# Patient Record
Sex: FEMALE | Race: WHITE | HISPANIC OR LATINO | Employment: OTHER | URBAN - NONMETROPOLITAN AREA
[De-identification: names, ages, dates, MRNs, and addresses within clinical notes are randomized per-mention and may not be internally consistent; named-entity substitution may affect disease eponyms.]

---

## 2017-01-04 ENCOUNTER — OFFICE VISIT (OUTPATIENT)
Dept: SURGERY | Facility: CLINIC | Age: 37
End: 2017-01-04

## 2017-01-04 VITALS
DIASTOLIC BLOOD PRESSURE: 70 MMHG | SYSTOLIC BLOOD PRESSURE: 120 MMHG | OXYGEN SATURATION: 98 % | BODY MASS INDEX: 40.8 KG/M2 | TEMPERATURE: 96.9 F | HEIGHT: 70 IN | HEART RATE: 90 BPM | WEIGHT: 285 LBS

## 2017-01-04 DIAGNOSIS — L02.415 CUTANEOUS ABSCESS OF RIGHT LOWER EXTREMITY: Primary | ICD-10-CM

## 2017-01-04 PROCEDURE — 99213 OFFICE O/P EST LOW 20 MIN: CPT | Performed by: SURGERY

## 2017-01-04 RX ORDER — SULFAMETHOXAZOLE AND TRIMETHOPRIM 400; 80 MG/1; MG/1
1 TABLET ORAL 2 TIMES DAILY
Qty: 14 TABLET | Refills: 0 | Status: SHIPPED | OUTPATIENT
Start: 2017-01-04 | End: 2017-01-11

## 2017-01-04 NOTE — MR AVS SNAPSHOT
Maria Murray   1/4/2017 10:30 AM   Office Visit    Dept Phone:  [phone]   Encounter #:  [id number]    Provider:  Sonia Conley MD   Department:  [facility] GENERAL SURGERY                Your Full Care Plan              Your Updated Medication List          This list is accurate as of: 1/4/17 10:41 AM.  Always use your most recent med list.                lamoTRIgine 100 MG tablet   Commonly known as:  LaMICtal       SYNTHROID 50 MCG tablet   Generic drug:  levothyroxine   Take 1 tablet by mouth Daily.       venlafaxine 37.5 MG tablet   Commonly known as:  EFFEXOR               You Were Diagnosed With        Codes Comments    Cutaneous abscess of right lower extremity    -  Primary ICD-10-CM: L02.415  ICD-9-CM: 682.6       Instructions     None    Patient Instructions History      Upcoming Appointments     Visit Type Date Time Department    FOLLOW UP 1/4/2017 10:30 AM [facility]    FOLLOW UP 1/5/2017  1:00 PM [facility]    ULTRASOUND 1/24/2017  2:10 PM [facility]YN [facility]    ULTRASOUND 1/24/2017  2:10 PM [facility]YN [facility]    FOLLOW UP 2/21/2017  1:00 PM [facility] PC [facility]    FOLLOW UP 6/6/2017  1:00 PM [facility]      MyChart Signup     Our records indicate that you have an active [facility] [employer]Street account.    You can view your After Visit Summary by going to [employer] and logging in with your QuinStreet username and password.  If you don't have a QuinStreet username and password but a parent or guardian has access to your record, the parent or guardian should login with their own QuinStreet username and password and access your record to view the After Visit Summary.    If you have questions, you can email [email or web address] or call [phone] to talk to our QuinStreet staff.  Remember, QuinStreet is NOT to be used for urgent needs.  For medical emergencies, dial 911.               Other Info from Your Visit       "       Your Appointments     Jan 05, 2017  1:00 PM EST   Follow Up with Sonia Conley MD   [facility] GENERAL SURGERY (--)    [address]  [facility] [address] [phone]           Arrive 15 minutes prior to appointment.            Jan 24, 2017  2:10 PM EST   Ultrasound with Donna Fuentes MD   [facility] OBSTETRICS AND GYNECOLOGY (--)    [address]  [facility] [address] [phone]            Jan 24, 2017  2:10 PM EST   Ultrasound with ULTRASOUND ROOM OBGYN [facility] OBSTETRICS AND GYNECOLOGY (--)    [address]  [facility] [address] [phone]            Feb 21, 2017  1:00 PM EST   Follow Up with Marilyn K Vermeesch, MD   [facility] PRIMARY CARE (--)    [address]  [facility] [address] [phone]           Arrive 15 minutes prior to appointment.            Jun 06, 2017  1:00 PM EDT   Follow Up with Virginia ROSALES MD   [facility] INTERNAL MEDICINE AND ENDOCRINOLOGY (--)    [address]  [facility] [address] [phone]           Arrive 15 minutes prior to appointment.              Allergies     Penicillins        Reason for Visit     Follow-up Rt. lower leg extremity      Vital Signs     Blood Pressure Pulse Temperature Height Weight Oxygen Saturation    120/70 90 96.9 °F (36.1 °C) 70\" (177.8 cm) 285 lb (129 kg) 98%    Body Mass Index Smoking Status                40.89 kg/m2 Never Smoker          Problems and Diagnoses Noted     Abscess        "

## 2017-01-04 NOTE — PROGRESS NOTES
Subjective   Maria Murray is a 36 y.o. female.   Chief Complaint   Patient presents with   • Follow-up     Rt. lower leg extremity       History of Present Illness     Right leg wound is healing slowly. No fever but has some drainage.    The following portions of the patient's history were reviewed and updated as appropriate: allergies, current medications, past family history, past medical history, past social history, past surgical history and problem list.    Review of Systems   Constitutional: Negative.    HENT: Negative.    Eyes: Negative.    Respiratory: Negative.    Cardiovascular: Negative.    Gastrointestinal: Negative.    Endocrine: Negative.    Genitourinary: Negative.    Musculoskeletal: Negative.    Skin: Negative.    Allergic/Immunologic: Negative.    Neurological: Negative.    Hematological: Negative.    Psychiatric/Behavioral: Negative.        Objective   Physical Exam   Constitutional: She appears well-developed and well-nourished. No distress.   HENT:   Head: Normocephalic and atraumatic.   Cardiovascular: Normal rate and regular rhythm.    Pulmonary/Chest: Effort normal.   Neurological: She is alert.   Skin: Skin is warm and dry. She is not diaphoretic.   Right leg wound- mild erythema and induration, no abscess, wound was cleaned and drebrided again   Psychiatric: She has a normal mood and affect. Her behavior is normal.   Nursing note and vitals reviewed.      Assessment/Plan   Right lower extremity dog bite wound is healing slow which could be due to DM. I will reevaluate tomorrow. If wound continues to deteriorate with fever, a wide excision with wound vac placement in OR will be required. I have explained this to pt who agrees.

## 2017-01-09 ENCOUNTER — OFFICE VISIT (OUTPATIENT)
Dept: SURGERY | Facility: CLINIC | Age: 37
End: 2017-01-09

## 2017-01-09 VITALS
HEART RATE: 74 BPM | HEIGHT: 70 IN | WEIGHT: 285 LBS | TEMPERATURE: 97.5 F | SYSTOLIC BLOOD PRESSURE: 120 MMHG | OXYGEN SATURATION: 98 % | BODY MASS INDEX: 40.8 KG/M2 | DIASTOLIC BLOOD PRESSURE: 60 MMHG

## 2017-01-09 DIAGNOSIS — L02.415 CUTANEOUS ABSCESS OF RIGHT LOWER EXTREMITY: Primary | ICD-10-CM

## 2017-01-09 PROCEDURE — 99212 OFFICE O/P EST SF 10 MIN: CPT | Performed by: SURGERY

## 2017-01-09 NOTE — PROGRESS NOTES
Subjective   Maria Murray is a 36 y.o. female.   Chief Complaint   Patient presents with   • Wound Check     Rt. lower extremity. Pt. is currently taking bactrim       History of Present Illness     Right leg wound looks much better today.  She denies fever.  She is doing daily dressing change    The following portions of the patient's history were reviewed and updated as appropriate: allergies, current medications, past family history, past medical history, past social history, past surgical history and problem list.    Review of Systems   Constitutional: Negative.    HENT: Negative.    Eyes: Negative.    Respiratory: Negative.    Cardiovascular: Negative.    Gastrointestinal: Negative.    Endocrine: Negative.    Genitourinary: Negative.    Musculoskeletal: Negative.    Skin: Positive for wound.   Allergic/Immunologic: Negative.    Neurological: Negative.    Hematological: Negative.    Psychiatric/Behavioral: Negative.        Objective   Physical Exam   Constitutional: She is oriented to person, place, and time. She appears well-developed and well-nourished. No distress.   HENT:   Head: Normocephalic and atraumatic.   Cardiovascular: Normal rate and regular rhythm.    Neurological: She is alert and oriented to person, place, and time.   Skin: Skin is warm and dry. No rash noted. She is not diaphoretic. No erythema.   No erythema today, no fluctuation or abscess, wound was cleaned and dressed   Nursing note and vitals reviewed.      Assessment/Plan   Right leg wound is healing well.  She will follow-up in 3 days for reevaluation.  She will do daily dressing change.

## 2017-01-09 NOTE — MR AVS SNAPSHOT
Maria Murray   1/9/2017 9:15 AM   Office Visit    Dept Phone:  [phone]   Encounter #:  [id number]    Provider:  Sonia Conley MD   Department:  [facility] GENERAL SURGERY                Your Full Care Plan              Your Updated Medication List          This list is accurate as of: 1/9/17  9:01 AM.  Always use your most recent med list.                lamoTRIgine 100 MG tablet   Commonly known as:  LaMICtal       sulfamethoxazole-trimethoprim 400-80 MG tablet   Commonly known as:  BACTRIM   Take 1 tablet by mouth 2 (Two) Times a Day for 7 days.       SYNTHROID 50 MCG tablet   Generic drug:  levothyroxine   Take 1 tablet by mouth Daily.       venlafaxine 37.5 MG tablet   Commonly known as:  EFFEXOR               You Were Diagnosed With        Codes Comments    Cutaneous abscess of right lower extremity    -  Primary ICD-10-CM: L02.415  ICD-9-CM: 682.6       Instructions     None    Patient Instructions History      Upcoming Appointments     Visit Type Date Time Department    FOLLOW UP 1/9/2017  9:15 AM [facility]    FOLLOW UP 1/12/2017  9:00 AM [facility]    ULTRASOUND 1/24/2017  2:10 PM [facility]YN [facility]    ULTRASOUND 1/24/2017  2:10 PM [facility]YN [facility]    FOLLOW UP 2/21/2017  1:00 PM [facility] PC [facility]    FOLLOW UP 6/6/2017  1:00 PM [facility]      MyChart Signup     Our records indicate that you have an active [employer] account.    You can view your After Visit Summary by going to [employer] and logging in with your [employer]k username and password.  If you don't have a Roadmunk username and password but a parent or guardian has access to your record, the parent or guardian should login with their own Roadmunk username and password and access your record to view the After Visit Summary.    If you have questions, you can email [email or web address] or call [phone] to talk to our [employer]  "staff.  Remember, MyChart is NOT to be used for urgent needs.  For medical emergencies, dial 911.               Other Info from Your Visit           Your Appointments     Jan 09, 2017  9:15 AM EST   Follow Up with Sonia Conley MD   [facility] GENERAL SURGERY (--)    [address]  [facility] [id number] [phone]           Arrive 15 minutes prior to appointment.            Jan 12, 2017  9:00 AM EST   Follow Up with Sonia Conley MD   [facility] GENERAL SURGERY (--)    [address]  [facility] [address] [phone]           Arrive 15 minutes prior to appointment.            Jan 24, 2017  2:10 PM EST   Ultrasound with Donna Fuentes MD   [facility] OBSTETRICS AND GYNECOLOGY (--)    [address]  [facility] [address]   [phone]            Jan 24, 2017  2:10 PM EST   Ultrasound with ULTRASOUND ROOM OBGYN [facility] OBSTETRICS AND GYNECOLOGY (--)    [address]  [facility] [address]   [phone]            Feb 21, 2017  1:00 PM EST   Follow Up with Marilyn K Vermeesch, MD   [facility] PRIMARY CARE (--)    [address]  [facility] [address]   [phone]           Arrive 15 minutes prior to appointment.              Allergies     Penicillins        Reason for Visit     Wound Check Rt. lower extremity. Pt. is currently taking bactrim      Vital Signs     Blood Pressure Pulse Temperature Height Weight Oxygen Saturation    120/60 74 97.5 °F (36.4 °C) 70\" (177.8 cm) 285 lb (129 kg) 98%    Body Mass Index Smoking Status                40.89 kg/m2 Never Smoker          Problems and Diagnoses Noted     Abscess        "

## 2017-01-12 ENCOUNTER — OFFICE VISIT (OUTPATIENT)
Dept: SURGERY | Facility: CLINIC | Age: 37
End: 2017-01-12

## 2017-01-12 VITALS
TEMPERATURE: 97 F | WEIGHT: 277 LBS | HEIGHT: 70 IN | BODY MASS INDEX: 39.65 KG/M2 | SYSTOLIC BLOOD PRESSURE: 120 MMHG | DIASTOLIC BLOOD PRESSURE: 60 MMHG | HEART RATE: 84 BPM | OXYGEN SATURATION: 98 %

## 2017-01-12 DIAGNOSIS — M25.561 CHRONIC PAIN OF RIGHT KNEE: Primary | ICD-10-CM

## 2017-01-12 DIAGNOSIS — L02.415 CUTANEOUS ABSCESS OF RIGHT LOWER EXTREMITY: Primary | ICD-10-CM

## 2017-01-12 DIAGNOSIS — G89.29 CHRONIC PAIN OF RIGHT KNEE: Primary | ICD-10-CM

## 2017-01-12 PROCEDURE — 99212 OFFICE O/P EST SF 10 MIN: CPT | Performed by: SURGERY

## 2017-01-12 NOTE — PROGRESS NOTES
Subjective   Maria Murray is a 36 y.o. female.   Chief Complaint   Patient presents with   • Wound Check     Rt. lower extremity   Patient states wound is healing. Patient denies N,V, and fever.     History of Present Illness     Right lower extremity wound is healing well.  Patient is doing daily dressing change.  She has finished her antibiotics.  She denies fever or pain.    The following portions of the patient's history were reviewed and updated as appropriate: allergies, current medications, past family history, past medical history, past social history, past surgical history and problem list.    Review of Systems   Constitutional: Negative.    HENT: Negative.    Eyes: Negative.    Respiratory: Negative.    Cardiovascular: Negative.    Gastrointestinal: Negative.    Endocrine: Negative.    Genitourinary: Negative.    Musculoskeletal: Negative.    Skin: Positive for wound.   Allergic/Immunologic: Negative.    Neurological: Negative.    Hematological: Negative.    Psychiatric/Behavioral: Negative.        Objective   Physical Exam   Constitutional: She is oriented to person, place, and time. She appears well-developed and well-nourished. No distress.   HENT:   Head: Normocephalic and atraumatic.   Cardiovascular: Normal rate and regular rhythm.    Neurological: She is alert and oriented to person, place, and time.   Skin: Skin is warm and dry. No rash noted. She is not diaphoretic. No erythema.   No erythema today, no fluctuation or abscess, wound was cleaned and dressed   Nursing note and vitals reviewed.      Assessment/Plan   Right lower extremity wound is healing well now.  I'll follow her when necessary.  She was instructed to call me or come to ER if she notices increasing redness, discharge or fever.

## 2017-01-12 NOTE — MR AVS SNAPSHOT
Maria Murray   1/12/2017 9:00 AM   Office Visit    Dept Phone:  [phone]   Encounter #:  [id number]    Provider:  Sonia Conley MD   Department:  [facility] GENERAL SURGERY                Your Full Care Plan              Your Updated Medication List          This list is accurate as of: 1/12/17  9:17 AM.  Always use your most recent med list.                lamoTRIgine 100 MG tablet   Commonly known as:  LaMICtal       SYNTHROID 50 MCG tablet   Generic drug:  levothyroxine   Take 1 tablet by mouth Daily.       venlafaxine 37.5 MG tablet   Commonly known as:  EFFEXOR               You Were Diagnosed With        Codes Comments    Cutaneous abscess of right lower extremity    -  Primary ICD-10-CM: L02.415  ICD-9-CM: 682.6       Instructions     None    Patient Instructions History      Upcoming Appointments     Visit Type Date Time Department    FOLLOW UP 1/12/2017  9:00 AM [facility]    ULTRASOUND 1/24/2017  2:10 PM [facility] O[facility] [place]    ULTRASOUND 1/24/2017  2:10 PM [facility] OBGYN [facility]    FOLLOW UP 2/21/2017  1:00 PM [facility] PC [facility]    FOLLOW UP 6/6/2017  1:00 PM [facility]      MyChart Signup     Our records indicate that you have an active [facility] [employer] account.    You can view your After Visit Summary by going to Be[employer] and logging in with your [employer] username and password.  If you don't have a [employer] username and password but a parent or guardian has access to your record, the parent or guardian should login with their own [employer] username and password and access your record to view the After Visit Summary.    If you have questions, you can email [email or web address] or call [phone] to talk to our [employer] staff.  Remember, [employer] is NOT to be used for urgent needs.  For medical emergencies, dial 911.               Other Info from Your Visit           Your Appointments     Jan 24, 2017  2:10 PM  "EST   Ultrasound with Donna Fuentes MD   [facility] OBSTETRICS AND GYNECOLOGY (--)    [address]  [facility] [address]   [phone]            Jan 24, 2017  2:10 PM EST   Ultrasound with ULTRASOUND ROOM OBGYN [facility] OBSTETRICS AND GYNECOLOGY (--)    [address]  [facility] [address]   [phone]            Feb 21, 2017  1:00 PM EST   Follow Up with Marilyn K Vermeesch, MD   [facility] PRIMARY CARE (--)    [address]  [facility] [address]   [phone]           Arrive 15 minutes prior to appointment.            Jun 06, 2017  1:00 PM EDT   Follow Up with Virginia ROSALES MD   [facility] INTERNAL MEDICINE AND ENDOCRINOLOGY (--)    [address]  [facility] [address] [phone]           Arrive 15 minutes prior to appointment.              Allergies     Penicillins        Reason for Visit     Wound Check Rt. lower extremity      Vital Signs     Blood Pressure Pulse Temperature Height Weight Oxygen Saturation    120/60 84 97 °F (36.1 °C) 70\" (177.8 cm) 277 lb (126 kg) 98%    Body Mass Index Smoking Status                39.75 kg/m2 Never Smoker          Problems and Diagnoses Noted     Abscess        "

## 2017-01-26 ENCOUNTER — OFFICE VISIT (OUTPATIENT)
Dept: SURGERY | Facility: CLINIC | Age: 37
End: 2017-01-26

## 2017-01-26 VITALS
DIASTOLIC BLOOD PRESSURE: 80 MMHG | HEIGHT: 70 IN | TEMPERATURE: 97.2 F | OXYGEN SATURATION: 98 % | HEART RATE: 82 BPM | WEIGHT: 287 LBS | SYSTOLIC BLOOD PRESSURE: 122 MMHG | BODY MASS INDEX: 41.09 KG/M2

## 2017-01-26 DIAGNOSIS — L02.415 CUTANEOUS ABSCESS OF RIGHT LOWER EXTREMITY: Primary | ICD-10-CM

## 2017-01-26 PROCEDURE — 99212 OFFICE O/P EST SF 10 MIN: CPT | Performed by: SURGERY

## 2017-01-26 NOTE — MR AVS SNAPSHOT
Maria Murray   1/26/2017 9:00 AM   Office Visit    Dept Phone:  [phone]   Encounter #:  [id number]    Provider:  Sonia Conley MD   Department:  [facility] GENERAL SURGERY                Your Full Care Plan              Your Updated Medication List          This list is accurate as of: 1/26/17  9:02 AM.  Always use your most recent med list.                lamoTRIgine 100 MG tablet   Commonly known as:  LaMICtal       SYNTHROID 50 MCG tablet   Generic drug:  levothyroxine   Take 1 tablet by mouth Daily.       venlafaxine 37.5 MG tablet   Commonly known as:  EFFEXOR               You Were Diagnosed With        Codes Comments    Cutaneous abscess of right lower extremity    -  Primary ICD-10-CM: L02.415  ICD-9-CM: 682.6       Instructions     None    Patient Instructions History      Upcoming Appointments     Visit Type Date Time Department    FOLLOW UP 1/26/2017  9:00 AM [facility]    NEW PATIENT 1/30/2017  9:00 AM [facility]    FOLLOW UP 1/30/2017  8:30 AM [facility]    FOLLOW UP 2/21/2017  1:00 PM [facility] PC [facility]    ULTRASOUND 2/22/2017  2:00 PM [facility] OBGYN [facility]    OB FOLLOWUP 2/22/2017  2:20 PM [facility] OBGYN [facility]    FOLLOW UP 6/6/2017  1:00 PM [facility]      MyChart Signup     Our records indicate that you have an active [employer] account.    You can view your After Visit Summary by going to [employer] and logging in with your [employer] username and password.  If you don't have a [employer] username and password but a parent or guardian has access to your record, the parent or guardian should login with their own Anam Mobile username and password and access your record to view the After Visit Summary.    If you have questions, you can email [email or web address] or call [phone] to talk to our [employer] staff.  Remember, [employer] is NOT to be used for urgent needs.  For medical  "emergencies, dial 911.               Other Info from Your Visit           Your Appointments     Jan 30, 2017  8:30 AM EST   Follow Up with Sonia Conley MD   [facility] GENERAL SURGERY (--)    [address]  [place] [address] [phone]           Arrive 15 minutes prior to appointment.            Jan 30, 2017  9:00 AM EST   New Patient with Dejan Rodas MD   [facility] ORTHOPEDICS AND SPORTS MEDICINE (--)    [address]  [place] [id number] [phone]           Bring all previous medical records and films, along with current medications and insurance information.            Feb 21, 2017  1:00 PM EST   Follow Up with Marilyn K Vermeesch, MD   [facility] PRIMARY CARE (--)    [address]  [place] [address] [phone]           Arrive 15 minutes prior to appointment.            Feb 22, 2017  2:00 PM EST   Ultrasound with ULTRASOUND ROOM OBGYN [facility] OBSTETRICS AND GYNECOLOGY (--)    [address]  [place] [address] [phone]            Feb 22, 2017  2:20 PM EST   OB FOLLOWUP with Donna Fuentes MD   [facility] OBSTETRICS AND GYNECOLOGY (--)    [address]  [place] [address] [phone]              Allergies     Penicillins        Reason for Visit     Wound Check Patient c/o redness around dog bite. Patient denies fever and nausea.       Vital Signs     Blood Pressure Pulse Temperature Height Weight Oxygen Saturation    122/80 82 97.2 °F (36.2 °C) 70\" (177.8 cm) 287 lb (130 kg) 98%    Body Mass Index Smoking Status                41.18 kg/m2 Never Smoker          Problems and Diagnoses Noted     Abscess        "

## 2017-01-26 NOTE — PROGRESS NOTES
Subjective   Maria Murray is a 37 y.o. female.   Chief Complaint   Patient presents with   • Wound Check     Patient c/o redness around dog bite. Patient denies fever and nausea.        History of Present Illness     Right lower extremity wound has gotten smaller in size.  Patient is complaining of some redness around the wound.  She denies any discharge or fever.    The following portions of the patient's history were reviewed and updated as appropriate: allergies, current medications, past family history, past medical history, past social history, past surgical history and problem list.    Review of Systems   Constitutional: Negative.    HENT: Negative.    Eyes: Negative.    Respiratory: Negative.    Cardiovascular: Negative.    Gastrointestinal: Negative.    Endocrine: Negative.    Genitourinary: Negative.    Musculoskeletal: Negative.    Skin: Positive for wound.   Allergic/Immunologic: Negative.    Neurological: Negative.    Hematological: Negative.    Psychiatric/Behavioral: Negative.        Objective   Physical Exam   Constitutional: She is oriented to person, place, and time. She appears well-developed.   Cardiovascular: Normal rate and regular rhythm.    Pulmonary/Chest: Effort normal.   Neurological: She is alert and oriented to person, place, and time.   Skin: Skin is warm and dry. No rash noted. No erythema.   Right lower extremity wound-very superficial and a small in size now, take a scab is present on top, scab was removed, there were collected debrided and fluid underneath the scab which was cleaned no definitive erythema or abscess   Psychiatric: She has a normal mood and affect. Her behavior is normal.   Nursing note reviewed.      Assessment/Plan   Patient was instructed to clean the wound twice daily and to keep removing scab so that fluid does not collect underneath. I will see her again on Monday.

## 2017-01-30 ENCOUNTER — OFFICE VISIT (OUTPATIENT)
Dept: ORTHOPEDIC SURGERY | Facility: CLINIC | Age: 37
End: 2017-01-30

## 2017-01-30 ENCOUNTER — OFFICE VISIT (OUTPATIENT)
Dept: SURGERY | Facility: CLINIC | Age: 37
End: 2017-01-30

## 2017-01-30 VITALS — RESPIRATION RATE: 18 BRPM | WEIGHT: 286.5 LBS | BODY MASS INDEX: 41.01 KG/M2 | HEIGHT: 70 IN

## 2017-01-30 VITALS
SYSTOLIC BLOOD PRESSURE: 124 MMHG | WEIGHT: 285.6 LBS | RESPIRATION RATE: 16 BRPM | HEART RATE: 76 BPM | OXYGEN SATURATION: 96 % | HEIGHT: 70 IN | BODY MASS INDEX: 40.89 KG/M2 | DIASTOLIC BLOOD PRESSURE: 80 MMHG | TEMPERATURE: 98.2 F

## 2017-01-30 DIAGNOSIS — G89.29 HIP PAIN, CHRONIC, RIGHT: ICD-10-CM

## 2017-01-30 DIAGNOSIS — G89.29 CHRONIC RIGHT-SIDED LOW BACK PAIN WITH RIGHT-SIDED SCIATICA: ICD-10-CM

## 2017-01-30 DIAGNOSIS — M25.551 HIP PAIN, CHRONIC, RIGHT: ICD-10-CM

## 2017-01-30 DIAGNOSIS — L02.415 CUTANEOUS ABSCESS OF RIGHT LOWER EXTREMITY: Primary | ICD-10-CM

## 2017-01-30 DIAGNOSIS — M94.261 CHONDROMALACIA, KNEE, RIGHT: Primary | ICD-10-CM

## 2017-01-30 DIAGNOSIS — M54.41 CHRONIC RIGHT-SIDED LOW BACK PAIN WITH RIGHT-SIDED SCIATICA: ICD-10-CM

## 2017-01-30 PROCEDURE — 99212 OFFICE O/P EST SF 10 MIN: CPT | Performed by: SURGERY

## 2017-01-30 PROCEDURE — 99244 OFF/OP CNSLTJ NEW/EST MOD 40: CPT | Performed by: ORTHOPAEDIC SURGERY

## 2017-01-30 RX ORDER — PROPRANOLOL HYDROCHLORIDE 10 MG/1
TABLET ORAL
COMMUNITY
Start: 2017-01-11 | End: 2017-03-27 | Stop reason: ALTCHOICE

## 2017-01-30 RX ORDER — MELOXICAM 7.5 MG/1
7.5 TABLET ORAL DAILY
Qty: 60 TABLET | Refills: 1 | Status: SHIPPED | OUTPATIENT
Start: 2017-01-30 | End: 2017-02-21

## 2017-01-30 NOTE — MR AVS SNAPSHOT
Maria Murray   1/30/2017 8:30 AM   Office Visit    Dept Phone:  [phone]   Encounter #:  [id number]    Provider:  Sonia Conley MD   Department:  [facility] GENERAL SURGERY                Your Full Care Plan              Your Updated Medication List          This list is accurate as of: 1/30/17  9:16 AM.  Always use your most recent med list.                lamoTRIgine 100 MG tablet   Commonly known as:  LaMICtal       propranolol 10 MG tablet   Commonly known as:  INDERAL       SYNTHROID 50 MCG tablet   Generic drug:  levothyroxine   Take 1 tablet by mouth Daily.       venlafaxine 37.5 MG tablet   Commonly known as:  EFFEXOR               Instructions     None    Patient Instructions History      Upcoming Appointments     Visit Type Date Time Department    NEW PATIENT 1/30/2017  9:00 AM [facility]    FOLLOW UP 1/30/2017  8:30 AM [facility] GEN [facility] [place]    FOLLOW UP 2/21/2017  1:00 PM [facility] PC [facility]    ULTRASOUND 2/22/2017  2:00 PM [facility]    OB FOLLOWUP 2/22/2017  2:20 PM [facility] [place]    FOLLOW UP 6/6/2017  1:00 PM [facility] END [facility]      MyChart Signup     Our records indicate that you have an active [employer] [facility] [employer] account.    You can view your After Visit Summary by going to [employer] and logging in with your [employer] username and password.  If you don't have a [employer] username and password but a parent or guardian has access to your record, the parent or guardian should login with their own [employer] username and password and access your record to view the After Visit Summary.    If you have questions, you can email [email or web address] or call [phone] to talk to our [employer] staff.  Remember, [employer] is NOT to be used for urgent needs.  For medical emergencies, dial 911.               Other Info from Your Visit           Your Appointments     Feb 21, 2017  1:00 PM EST   Follow Up with Sarah PAT  "Vermeesch, MD   [facility] PRIMARY CARE (--)    [address]  [place] [address] [phone]           Arrive 15 minutes prior to appointment.            Feb 22, 2017  2:00 PM EST   Ultrasound with ULTRASOUND ROOM OBGYN [facility] OBSTETRICS AND GYNECOLOGY (--)    [address]  [place] [address]   [phone]            Feb 22, 2017  2:20 PM EST   OB FOLLOWUP with Donna Fuentes MD   [facility] OBSTETRICS AND GYNECOLOGY (--)    [address]  [place] [facility] [place] [address]   [phone]            Jun 06, 2017  1:00 PM EDT   Follow Up with Virginia ROSALES MD   [facility] INTERNAL MEDICINE AND ENDOCRINOLOGY (--)    [address]  [place] [address] [phone]           Arrive 15 minutes prior to appointment.              Allergies     Penicillins        Reason for Visit     Follow-up abscess    Wound Check right lower extremity      Vital Signs     Blood Pressure Pulse Temperature Respirations Height Weight    124/80 76 98.2 °F (36.8 °C) (Temporal Artery ) 16 70\" (177.8 cm) 285 lb 9.6 oz (130 kg)    Oxygen Saturation Body Mass Index Smoking Status             96% 40.98 kg/m2 Never Smoker           "

## 2017-01-30 NOTE — PROGRESS NOTES
Subjective   Maria Murray is a 37 y.o. female.   Chief Complaint   Patient presents with   • Follow-up     abscess   • Wound Check     right lower extremity     Patient had her right lower leg wound debrided last visit. It still has some redness but is getting better.     History of Present Illness     Right leg wound is healing well. No fever.    The following portions of the patient's history were reviewed and updated as appropriate: allergies, current medications, past family history, past medical history, past social history, past surgical history and problem list.    Review of Systems   Constitutional: Negative.    HENT: Negative.    Eyes: Negative.    Respiratory: Negative.    Cardiovascular: Negative.    Gastrointestinal: Negative.    Endocrine: Negative.    Genitourinary: Negative.    Musculoskeletal: Negative.    Skin: Positive for color change and wound.   Allergic/Immunologic: Negative.    Neurological: Negative.    Hematological: Negative.    Psychiatric/Behavioral: Negative.        Objective   Physical Exam   Constitutional: She is oriented to person, place, and time. She appears well-developed.   Cardiovascular: Normal rate and regular rhythm.    Pulmonary/Chest: Effort normal.   Neurological: She is alert and oriented to person, place, and time.   Skin: Skin is warm and dry. No rash noted. No erythema.   Right lower extremity wound-very superficial and a small in size now, scab is present on top, scab was removed,  no definitive erythema or abscess   Psychiatric: She has a normal mood and affect. Her behavior is normal.   Nursing note reviewed.      Assessment/Plan   Wound almost healed. Will follow in 2 weeks.

## 2017-02-17 ENCOUNTER — LAB (OUTPATIENT)
Dept: INTERNAL MEDICINE | Facility: CLINIC | Age: 37
End: 2017-02-17

## 2017-02-17 DIAGNOSIS — R52 BODY ACHES: Primary | ICD-10-CM

## 2017-02-17 LAB
EXPIRATION DATE: NORMAL
FLUAV AG NPH QL: NEGATIVE
FLUBV AG NPH QL: NEGATIVE
INTERNAL CONTROL: NORMAL
Lab: NORMAL

## 2017-02-17 PROCEDURE — 87804 INFLUENZA ASSAY W/OPTIC: CPT | Performed by: PHYSICIAN ASSISTANT

## 2017-02-17 RX ORDER — OSELTAMIVIR PHOSPHATE 75 MG/1
75 CAPSULE ORAL DAILY
Qty: 10 CAPSULE | Refills: 0 | Status: SHIPPED | OUTPATIENT
Start: 2017-02-17 | End: 2017-02-21

## 2017-02-21 ENCOUNTER — OFFICE VISIT (OUTPATIENT)
Dept: INTERNAL MEDICINE | Facility: CLINIC | Age: 37
End: 2017-02-21

## 2017-02-21 VITALS
DIASTOLIC BLOOD PRESSURE: 78 MMHG | WEIGHT: 286 LBS | HEART RATE: 81 BPM | SYSTOLIC BLOOD PRESSURE: 126 MMHG | HEIGHT: 70 IN | BODY MASS INDEX: 40.94 KG/M2 | TEMPERATURE: 98.1 F | OXYGEN SATURATION: 98 %

## 2017-02-21 DIAGNOSIS — F32.A DEPRESSION, UNSPECIFIED DEPRESSION TYPE: ICD-10-CM

## 2017-02-21 DIAGNOSIS — E03.8 OTHER SPECIFIED HYPOTHYROIDISM: Primary | ICD-10-CM

## 2017-02-21 DIAGNOSIS — Z91.09 ENVIRONMENTAL ALLERGIES: ICD-10-CM

## 2017-02-21 DIAGNOSIS — K21.9 GERD WITHOUT ESOPHAGITIS: ICD-10-CM

## 2017-02-21 DIAGNOSIS — L91.8 SKIN TAG: ICD-10-CM

## 2017-02-21 PROCEDURE — 11200 RMVL SKIN TAGS UP TO&INC 15: CPT | Performed by: INTERNAL MEDICINE

## 2017-02-21 PROCEDURE — 99214 OFFICE O/P EST MOD 30 MIN: CPT | Performed by: INTERNAL MEDICINE

## 2017-02-21 NOTE — PROGRESS NOTES
Chief Complaint   Patient presents with   • Follow-up     6 months for GERD and Hypothyroidism. Pt would like to discuss skin tag removal and referral to ENT.      Subjective   Maria Murray is a 37 y.o. female.     HPI Comments: Here for followup of depression, celiac, hypothyroid and right knee pain.  She is on lamictal 200 mg q day, and effexor 37.5 mg BID. She is seeing her psychiatrist in [place].  She does not see a counselor, but soon will start.   She has several special-needs children with autism spectrum disorder.  She is very stressed at home.  Denies depression but does have some anxiety.  She is trying to follow a good gluten free diet.  Has some diarrhea, but gets more dizziness if she eats gluten.   Her right knee is doing OK, but she is starting PT per Dr Rodas.  She is not taking mobic.  Has a skin tag on right shoulder that is getting caught in her hair she would like removed.   Her allergies are bothering her.  Has chronic PND.  Her kids go to [facility] in [place] and she would like to be tested for allergies by them.  She does not take any thing.  Has very itchy eyes.   Thyroid labs were all normal in November 2016.       The following portions of the patient's history were reviewed and updated as appropriate: allergies, current medications, past family history, past medical history, past social history, past surgical history and problem list.    Review of Systems   Gastrointestinal: Positive for diarrhea.   Endocrine: Negative for cold intolerance and heat intolerance.   Musculoskeletal: Positive for arthralgias.   Skin:        Skin tag on right shoulder   Allergic/Immunologic: Positive for environmental allergies.   Neurological: Positive for dizziness.   Psychiatric/Behavioral: Negative for dysphoric mood, sleep disturbance and suicidal ideas. The patient is nervous/anxious.    All other systems reviewed and are negative.      Objective   Visit Vitals   • /78   • Pulse  "81   • Temp 98.1 °F (36.7 °C)   • Ht 70\" (177.8 cm)   • Wt 286 lb (130 kg)   • SpO2 98%   • BMI 41.04 kg/m2     Body mass index is 41.04 kg/(m^2).  Physical Exam   Constitutional: She is oriented to person, place, and time. She appears well-developed and well-nourished.   Morbidly obese   HENT:   Head: Normocephalic and atraumatic.   Mouth/Throat: Oropharynx is clear and moist.   Eyes: Conjunctivae and EOM are normal. Pupils are equal, round, and reactive to light.   Neck: Normal range of motion. Neck supple. No thyromegaly present.   Cardiovascular: Normal rate, regular rhythm, normal heart sounds and intact distal pulses.    No murmur heard.  Pulmonary/Chest: Effort normal and breath sounds normal. No respiratory distress.   Abdominal: Soft. Bowel sounds are normal.   Musculoskeletal: She exhibits no edema.   Lymphadenopathy:     She has no cervical adenopathy.   Neurological: She is alert and oriented to person, place, and time. No cranial nerve deficit.   Skin:   8 mm skin tag with narrow stalk on right shoulder, area cleansed with alcohol prep, sterile scissors and forceps used and skin tag was removed, no blood loss and patient tolerated procedure well   Psychiatric: She has a normal mood and affect. Her behavior is normal. Judgment normal.   Nursing note and vitals reviewed.      Assessment/Plan   Maria Murray is here today and the following problems have been addressed:      Maria was seen today for follow-up.    Diagnoses and all orders for this visit:    Other specified hypothyroidism    Depression, unspecified depression type    GERD without esophagitis    Environmental allergies  -     Ambulatory Referral to ENT (Otolaryngology)    Skin tag    Skin tag removed today, no complication  No med changes  Referred to ENT per pt request for allergy tested  Labs next visit  Continue to see psychiatrist  I do recommend she see counselor with her children for family counseling    RTC  6 mo or prn  "

## 2017-02-22 ENCOUNTER — OFFICE VISIT (OUTPATIENT)
Dept: OBSTETRICS AND GYNECOLOGY | Facility: CLINIC | Age: 37
End: 2017-02-22

## 2017-02-22 VITALS
HEIGHT: 70 IN | BODY MASS INDEX: 40.66 KG/M2 | WEIGHT: 284 LBS | DIASTOLIC BLOOD PRESSURE: 70 MMHG | SYSTOLIC BLOOD PRESSURE: 120 MMHG

## 2017-02-22 DIAGNOSIS — N94.6 DYSMENORRHEA: ICD-10-CM

## 2017-02-22 DIAGNOSIS — N92.1 MENORRHAGIA WITH IRREGULAR CYCLE: Primary | ICD-10-CM

## 2017-02-22 PROCEDURE — 76830 TRANSVAGINAL US NON-OB: CPT | Performed by: OBSTETRICS & GYNECOLOGY

## 2017-02-22 PROCEDURE — 99214 OFFICE O/P EST MOD 30 MIN: CPT | Performed by: OBSTETRICS & GYNECOLOGY

## 2017-02-22 NOTE — PROGRESS NOTES
Subjective  Chief Complaint   Patient presents with   • Follow-up     transvaginal ultrasound for menorrhagia     Patient is 37 y.o.  here for menorrhagia.  Pt gives history of menorrhagia with menses q 30-35 days; lasting 6-7 days.  Pt reports using menstrual cup with 2 oz of blood; changes 8x/24 hour.  Pt gives history of pp hemorrhage with previous pregnancies.  Pt reports having scheduled csection with last delivery secondary to history of pp hemorrhage with prior pregnancy.  Pt had delayed pp hemorrhage with Csection as well; reports receiving 12 units of PRBCs.  Pt delivered in [place].  Pt then  with no menses until November and now menses as noted above.  Pt reports seeing hematologist and denies any bleeding or clotting disorders.  Spouse has had vasectomy; pt has completed childbearing.    History  Past Medical History   Diagnosis Date   • Ankle sprain    • Anxiety    • Arthritis    • Bursitis of hip    • Chronic pain disorder    • Dislocation, hip    • Fracture, patella    • GERD (gastroesophageal reflux disease)    • Heart attack    • Migraine    • Migraines    • Palpitations    • Sore throat    • Strep throat    • Subluxation of patella    • Tear of meniscus of knee    • Thyroid disease    • Urinary symptom or sign      Current Outpatient Prescriptions on File Prior to Visit   Medication Sig Dispense Refill   • lamoTRIgine (LaMICtal) 100 MG tablet 2 (two) times a day.     • propranolol (INDERAL) 10 MG tablet      • SYNTHROID 50 MCG tablet Take 1 tablet by mouth Daily. 30 tablet 5   • venlafaxine (EFFEXOR) 37.5 MG tablet 37.5 mg 2 (Two) Times a Day.       No current facility-administered medications on file prior to visit.      Allergies   Allergen Reactions   • Penicillins      Past Surgical History   Procedure Laterality Date   •  section     • Other surgical history       Dilation And Curettage Of Cervical Stump   • Knee surgery        Left ATS     Family History  "  Problem Relation Age of Onset   • Bone cancer Other    • Diabetes Other    • Other Other      malignant neoplasm of breast   • Cancer Mother      breast   • Bone cancer Father    • Cancer Maternal Aunt      breast   • Diabetes Maternal Uncle      Social History     Social History   • Marital status:      Spouse name: N/A   • Number of children: N/A   • Years of education: N/A     Social History Main Topics   • Smoking status: Never Smoker   • Smokeless tobacco: Never Used   • Alcohol use No   • Drug use: No   • Sexual activity: Yes     Partners: Male     Birth control/ protection: Other      Comment:  vasectomy     Other Topics Concern   • None     Social History Narrative     Review of Systems  All systems were reviewed and negative except for:  Musculoskeletal: positive for  joint pain    Objective  Vitals:    02/22/17 1433   BP: 120/70   Weight: 284 lb (129 kg)   Height: 70\" (177.8 cm)     Physical Exam:  General Appearance: alert, appears stated age and cooperative  Head: normocephalic, without obvious abnormality and atraumatic  Eyes: lids and lashes normal, conjunctivae and sclerae normal, no icterus, no pallor and corneas clear  Ears: ears appear intact with no abnormalities noted  Neck: suppple, trachea midline and no thyromegaly  Lungs: clear to auscultation, respirations regular, respirations even and respirations unlabored  Heart: regular rhythm and normal rate, normal S1, S2, no murmur, gallop, or rubs and no click  Abdomen: normal bowel sounds, no masses, no hepatomegaly, no splenomegaly, soft non-tender, no guarding and no rebound tenderness  Extremities: moves extremities well, no edema, no cyanosis and no redness  Skin: no bleeding, bruising or rash and no leasions noted  Psych: normal mood and affect, oriented to person, time and place, thought content organized and appropriate judgment    Lab Review   No data reviewed    Imaging   Pelvic ultrasound images independantly reviewed - " TVS today shows normal uterus with ET 10.8mm; no fibroids seen; normal adnexae.    Assessment/Plan    Problem List Items Addressed This Visit     None      Visit Diagnoses     Menorrhagia with irregular cycle    -  Primary  Long discussion with patient regarding various treatment options.  Discussed medical management vs surgical.  Discussed hysterectomy vs trial with novasure ablation.  Risks, complications, benefits of each discussed.  All questions answered.  TVS findings reviewed with pt as well.  Info given in pamphlet form as well.  Pt to discuss with spouse and will call to schedule D&C, dx. Hysteroscopy with novasure ablation.    Relevant Orders    US Non-ob Transvaginal          Follow up prn    This note was electronically signed.  Donna Fuentes M.D.

## 2017-02-22 NOTE — PATIENT INSTRUCTIONS
Menorrhagia  Menorrhagia is the medical term for when your menstrual periods are heavy or last longer than usual. With menorrhagia, every period you have may cause enough blood loss and cramping that you are unable to maintain your usual activities.  CAUSES   In some cases, the cause of heavy periods is unknown, but a number of conditions may cause menorrhagia. Common causes include:  · A problem with the hormone-producing thyroid gland (hypothyroid).  · Noncancerous growths in the uterus (polyps or fibroids).  · An imbalance of the estrogen and progesterone hormones.  · One of your ovaries not releasing an egg during one or more months.  · Side effects of having an intrauterine device (IUD).  · Side effects of some medicines, such as anti-inflammatory medicines or blood thinners.  · A bleeding disorder that stops your blood from clotting normally.  SIGNS AND SYMPTOMS   During a normal period, bleeding lasts between 4 and 8 days. Signs that your periods are too heavy include:  · You routinely have to change your pad or tampon every 1 or 2 hours because it is completely soaked.  · You pass blood clots larger than 1 inch (2.5 cm) in size.  · You have bleeding for more than 7 days.  · You need to use pads and tampons at the same time because of heavy bleeding.  · You need to wake up to change your pads or tampons during the night.  · You have symptoms of anemia, such as tiredness, fatigue, or shortness of breath.   DIAGNOSIS   Your health care provider will perform a physical exam and ask you questions about your symptoms and menstrual history. Other tests may be ordered based on what the health care provider finds during the exam. These tests can include:  · Blood tests. Blood tests are used to check if you are pregnant or have hormonal changes, a bleeding or thyroid disorder, low iron levels (anemia), or other problems.  · Endometrial biopsy. Your health care provider takes a sample of tissue from the inside of your  uterus to be examined under a microscope.  · Pelvic ultrasound. This test uses sound waves to make a picture of your uterus, ovaries, and vagina. The pictures can show if you have fibroids or other growths.  · Hysteroscopy. For this test, your health care provider will use a small telescope to look inside your uterus.  Based on the results of your initial tests, your health care provider may recommend further testing.  TREATMENT   Treatment may not be needed. If it is needed, your health care provider may recommend treatment with one or more medicines first. If these do not reduce bleeding enough, a surgical treatment might be an option. The best treatment for you will depend on:   · Whether you need to prevent pregnancy.    · Your desire to have children in the future.  · The cause and severity of your bleeding.  · Your opinion and personal preference.    Medicines for menorrhagia may include:  · Birth control methods that use hormones. These include the pill, skin patch, vaginal ring, shots that you get every 3 months, hormonal IUD, and implant. These treatments reduce bleeding during your menstrual period.  · Medicines that thicken blood and slow bleeding.  · Medicines that reduce swelling, such as ibuprofen.   · Medicines that contain a synthetic hormone called progestin.    · Medicines that make the ovaries stop working for a short time.    You may need surgical treatment for menorrhagia if the medicines are unsuccessful. Treatment options include:  · Dilation and curettage (D&C). In this procedure, your health care provider opens (dilates) your cervix and then scrapes or suctions tissue from the lining of your uterus to reduce menstrual bleeding.  · Operative hysteroscopy. This procedure uses a tiny tube with a light (hysteroscope) to view your uterine cavity and can help in the surgical removal of a polyp that may be causing heavy periods.  · Endometrial ablation. Through various techniques, your health care  provider permanently destroys the entire lining of your uterus (endometrium). After endometrial ablation, most women have little or no menstrual flow. Endometrial ablation reduces your ability to become pregnant.  · Endometrial resection. This surgical procedure uses an electrosurgical wire loop to remove the lining of the uterus. This procedure also reduces your ability to become pregnant.  · Hysterectomy. Surgical removal of the uterus and cervix is a permanent procedure that stops menstrual periods. Pregnancy is not possible after a hysterectomy. This procedure requires anesthesia and hospitalization.  HOME CARE INSTRUCTIONS   · Only take over-the-counter or prescription medicines as directed by your health care provider. Take prescribed medicines exactly as directed. Do not change or switch medicines without consulting your health care provider.  · Take any prescribed iron pills exactly as directed by your health care provider. Long-term heavy bleeding may result in low iron levels. Iron pills help replace the iron your body lost from heavy bleeding. Iron may cause constipation. If this becomes a problem, increase the bran, fruits, and roughage in your diet.  · Do not take aspirin or medicines that contain aspirin 1 week before or during your menstrual period. Aspirin may make the bleeding worse.  · If you need to change your sanitary pad or tampon more than once every 2 hours, stay in bed and rest as much as possible until the bleeding stops.  · Eat well-balanced meals. Eat foods high in iron. Examples are leafy green vegetables, meat, liver, eggs, and whole grain breads and cereals. Do not try to lose weight until the abnormal bleeding has stopped and your blood iron level is back to normal.  SEEK MEDICAL CARE IF:   · You soak through a pad or tampon every 1 or 2 hours, and this happens every time you have a period.  · You need to use pads and tampons at the same time because you are bleeding so much.  · You  need to change your pad or tampon during the night.  · You have a period that lasts for more than 8 days.  · You pass clots bigger than 1 inch wide.  · You have irregular periods that happen more or less often than once a month.  · You feel dizzy or faint.  · You feel very weak or tired.  · You feel short of breath or feel your heart is beating too fast when you exercise.  · You have nausea and vomiting or diarrhea while you are taking your medicine.  · You have any problems that may be related to the medicine you are taking.  SEEK IMMEDIATE MEDICAL CARE IF:   · You soak through 4 or more pads or tampons in 2 hours.  · You have any bleeding while you are pregnant.  MAKE SURE YOU:   · Understand these instructions.  · Will watch your condition.  · Will get help right away if you are not doing well or get worse.     This information is not intended to replace advice given to you by your health care provider. Make sure you discuss any questions you have with your health care provider.     Document Released: 12/18/2006 Document Revised: 12/23/2014 Document Reviewed: 06/08/2014  [employer] Patient Education ©2016 [employer]

## 2017-03-08 DIAGNOSIS — N92.1 MENORRHAGIA WITH IRREGULAR CYCLE: Primary | ICD-10-CM

## 2017-03-08 RX ORDER — SODIUM CHLORIDE 0.9 % (FLUSH) 0.9 %
1-10 SYRINGE (ML) INJECTION AS NEEDED
Status: CANCELLED | OUTPATIENT
Start: 2017-03-08

## 2017-03-13 ENCOUNTER — RESULTS ENCOUNTER (OUTPATIENT)
Dept: OBSTETRICS AND GYNECOLOGY | Facility: CLINIC | Age: 37
End: 2017-03-13

## 2017-03-13 DIAGNOSIS — N92.1 MENORRHAGIA WITH IRREGULAR CYCLE: ICD-10-CM

## 2017-03-15 ENCOUNTER — OFFICE VISIT (OUTPATIENT)
Dept: ORTHOPEDIC SURGERY | Facility: CLINIC | Age: 37
End: 2017-03-15

## 2017-03-15 VITALS — WEIGHT: 290.5 LBS | HEIGHT: 70 IN | BODY MASS INDEX: 41.59 KG/M2 | RESPIRATION RATE: 19 BRPM

## 2017-03-15 DIAGNOSIS — M25.551 RIGHT HIP PAIN: Primary | ICD-10-CM

## 2017-03-15 DIAGNOSIS — M94.261 CHONDROMALACIA, KNEE, RIGHT: Primary | ICD-10-CM

## 2017-03-15 PROCEDURE — 99214 OFFICE O/P EST MOD 30 MIN: CPT | Performed by: ORTHOPAEDIC SURGERY

## 2017-03-15 NOTE — PROGRESS NOTES
Subjective   Patient ID: Maria Murray is a 37 y.o. female  Follow-up and Pain of the Right Knee             History of Present Illness  4-6 months of increasing anterior right knee pain not improve with physical therapy only short-term relief with 3 or 4 injections of cortisone each of which has only lead to 2-3 weeks of relief.  Pain occurs with standing sitting even walking special going up hills pain is anterior at the right knee.  Denies locking giving way.  MRIs confirmed chondromalacia patella no other meniscal pathology identified x-rays have appeared normal in the past.    Significant allergy to penicillin which causes hives.  No other acute cardiac pulmonary or medical illness currently.    She is scheduled for uterine ablation procedure end of April.      Review of Systems   Constitutional: Negative for diaphoresis, fever and unexpected weight change.   HENT: Negative for dental problem and sore throat.    Eyes: Negative for visual disturbance.   Respiratory: Negative for shortness of breath.    Cardiovascular: Negative for chest pain.   Gastrointestinal: Negative for abdominal pain, constipation, diarrhea, nausea and vomiting.   Genitourinary: Negative for difficulty urinating and frequency.   Musculoskeletal: Positive for arthralgias.   Neurological: Negative for headaches.   Hematological: Does not bruise/bleed easily.   All other systems reviewed and are negative.      Past Medical History   Diagnosis Date   • Ankle sprain    • Anxiety    • Arthritis    • Bursitis of hip    • Chronic pain disorder    • Dislocation, hip    • Fracture, patella    • GERD (gastroesophageal reflux disease)    • Heart attack    • Migraine    • Migraines    • Palpitations    • Sore throat    • Strep throat    • Subluxation of patella    • Tear of meniscus of knee    • Thyroid disease    • Urinary symptom or sign         Past Surgical History   Procedure Laterality Date   •  section     • Other surgical  "history       Dilation And Curettage Of Cervical Stump   • Knee surgery  2000      Left ATS       Family History   Problem Relation Age of Onset   • Bone cancer Other    • Diabetes Other    • Other Other      malignant neoplasm of breast   • Cancer Mother      breast   • Bone cancer Father    • Cancer Maternal Aunt      breast   • Diabetes Maternal Uncle        Social History     Social History   • Marital status:      Spouse name: N/A   • Number of children: N/A   • Years of education: N/A     Occupational History   • Not on file.     Social History Main Topics   • Smoking status: Never Smoker   • Smokeless tobacco: Never Used   • Alcohol use No   • Drug use: No   • Sexual activity: Yes     Partners: Male     Birth control/ protection: Other      Comment:  vasectomy     Other Topics Concern   • Not on file     Social History Narrative       Allergies   Allergen Reactions   • Penicillins        Objective   Visit Vitals   • Resp 19   • Ht 70\" (177.8 cm)   • Wt 290 lb 8 oz (132 kg)   • LMP 02/05/2017   • BMI 41.68 kg/m2      Physical Exam   Constitutional: She appears well-developed and well-nourished.   Cardiovascular: Normal rate, regular rhythm, S1 normal, S2 normal and normal heart sounds.    Pulmonary/Chest: Effort normal and breath sounds normal.   Musculoskeletal:        Right knee: She exhibits swelling. Tenderness found.     Constitutional: He is oriented to person, place, and time. He appears well-developed and well-nourished.   HENT:   Head: Normocephalic and atraumatic.   Eyes: EOM are normal. Pupils are equal, round, and reactive to light.   Neck: Normal range of motion. Neck supple.   Cardiovascular: Normal rate.    Pulmonary/Chest: Effort normal and breath sounds normal.   Abdominal: Soft.   Neurological: He is alert and oriented to person, place, and time.   Skin: Skin is warm and dry.   Psychiatric: He has a normal mood and affect.   Nursing note and vitals reviewed.       Ortho Exam "     Right knee with positive patellofemoral apprehension sign, positive inferolateral patellar tenderness, negative medial and lateral joint line pain, negative Kathleen sign, negative Lockman sign, calf supple, no quadriceps atrophy.  Well-healed medial distal calf traumatic laceration no sign of active cellulitis ankle edema or compromise of neurovascular status  Assessment/Plan   Review of Radiographic Studies:    No new imaging done today.  X-rays right knee in the past of appeared normal      Procedures     There are no diagnoses linked to this encounter.   Risk, benefits, and merits of treatment alternatives reviewed with the patient and questions answered and The nature of the proposed surgery reviewed with the patient including risks, benefits, rehabilitation, recovery timeframe, and outcome expectations      Recommendations/Plan:   Surgery: Surgery proposed at this visit as noted.  Plan for right knee diagnostic arthroscopy probable chondral shaving/chondroplasty and/or other procedures as indicated    Patient agreeable to call or return sooner for any concerns.             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

## 2017-03-17 RX ORDER — SODIUM CHLORIDE 0.9 % (FLUSH) 0.9 %
1-10 SYRINGE (ML) INJECTION AS NEEDED
Status: CANCELLED | OUTPATIENT
Start: 2017-03-17

## 2017-03-27 ENCOUNTER — APPOINTMENT (OUTPATIENT)
Dept: PREADMISSION TESTING | Facility: HOSPITAL | Age: 37
End: 2017-03-27

## 2017-03-27 ENCOUNTER — HOSPITAL ENCOUNTER (OUTPATIENT)
Dept: GENERAL RADIOLOGY | Facility: HOSPITAL | Age: 37
Discharge: HOME | End: 2017-03-27
Admitting: ORTHOPAEDIC SURGERY

## 2017-03-27 VITALS
HEIGHT: 70 IN | BODY MASS INDEX: 40.8 KG/M2 | DIASTOLIC BLOOD PRESSURE: 81 MMHG | WEIGHT: 285 LBS | SYSTOLIC BLOOD PRESSURE: 147 MMHG

## 2017-03-27 LAB
ALBUMIN SERPL-MCNC: 4.4 G/DL (ref 3.5–5)
ALBUMIN/GLOB SERPL: 1.1 G/DL (ref 1–2)
ALP SERPL-CCNC: 50 U/L (ref 38–126)
ALT SERPL W P-5'-P-CCNC: 27 U/L (ref 13–69)
ANION GAP SERPL CALCULATED.3IONS-SCNC: 19.4 MMOL/L
APTT PPP: 28.9 SECONDS (ref 25–36)
AST SERPL-CCNC: 23 U/L (ref 15–46)
B-HCG UR QL: NEGATIVE
BASOPHILS # BLD AUTO: 0.01 10*3/MM3 (ref 0–0.2)
BASOPHILS NFR BLD AUTO: 0.1 % (ref 0–2.5)
BILIRUB SERPL-MCNC: 0.7 MG/DL (ref 0.2–1.3)
BILIRUB UR QL STRIP: NEGATIVE
BUN BLD-MCNC: 15 MG/DL (ref 7–20)
BUN/CREAT SERPL: 21.4 (ref 7.1–23.5)
CALCIUM SPEC-SCNC: 9.4 MG/DL (ref 8.4–10.2)
CHLORIDE SERPL-SCNC: 102 MMOL/L (ref 98–107)
CLARITY UR: CLEAR
CO2 SERPL-SCNC: 27 MMOL/L (ref 26–30)
COLOR UR: YELLOW
CREAT BLD-MCNC: 0.7 MG/DL (ref 0.6–1.3)
DEPRECATED RDW RBC AUTO: 39.4 FL (ref 37–54)
EOSINOPHIL # BLD AUTO: 0.12 10*3/MM3 (ref 0–0.7)
EOSINOPHIL NFR BLD AUTO: 1.7 % (ref 0–7)
ERYTHROCYTE [DISTWIDTH] IN BLOOD BY AUTOMATED COUNT: 12.8 % (ref 11.5–14.5)
GFR SERPL CREATININE-BSD FRML MDRD: 94 ML/MIN/1.73
GLOBULIN UR ELPH-MCNC: 3.9 GM/DL
GLUCOSE BLD-MCNC: 91 MG/DL (ref 74–98)
GLUCOSE UR STRIP-MCNC: NEGATIVE MG/DL
HCT VFR BLD AUTO: 41 % (ref 37–47)
HGB BLD-MCNC: 13.2 G/DL (ref 12–16)
HGB UR QL STRIP.AUTO: NEGATIVE
IMM GRANULOCYTES # BLD: 0.02 10*3/MM3 (ref 0–0.06)
IMM GRANULOCYTES NFR BLD: 0.3 % (ref 0–0.6)
INR PPP: 1.18 (ref 0.9–1.1)
KETONES UR QL STRIP: NEGATIVE
LEUKOCYTE ESTERASE UR QL STRIP.AUTO: NEGATIVE
LYMPHOCYTES # BLD AUTO: 1.52 10*3/MM3 (ref 0.6–3.4)
LYMPHOCYTES NFR BLD AUTO: 21.7 % (ref 10–50)
MCH RBC QN AUTO: 27.6 PG (ref 27–31)
MCHC RBC AUTO-ENTMCNC: 32.2 G/DL (ref 30–37)
MCV RBC AUTO: 85.6 FL (ref 81–99)
MONOCYTES # BLD AUTO: 0.59 10*3/MM3 (ref 0–0.9)
MONOCYTES NFR BLD AUTO: 8.4 % (ref 0–12)
NEUTROPHILS # BLD AUTO: 4.75 10*3/MM3 (ref 2–6.9)
NEUTROPHILS NFR BLD AUTO: 67.8 % (ref 37–80)
NITRITE UR QL STRIP: NEGATIVE
NRBC BLD MANUAL-RTO: 0 /100 WBC (ref 0–0)
PH UR STRIP.AUTO: 7 [PH] (ref 5–8)
PLATELET # BLD AUTO: 301 10*3/MM3 (ref 130–400)
PMV BLD AUTO: 12 FL (ref 6–12)
POTASSIUM BLD-SCNC: 4.4 MMOL/L (ref 3.5–5.1)
PROT SERPL-MCNC: 8.3 G/DL (ref 6.3–8.2)
PROT UR QL STRIP: NEGATIVE
PROTHROMBIN TIME: 12.9 SECONDS (ref 9.3–12.1)
RBC # BLD AUTO: 4.79 10*6/MM3 (ref 4.2–5.4)
SODIUM BLD-SCNC: 144 MMOL/L (ref 137–145)
SP GR UR STRIP: 1.02 (ref 1–1.03)
UROBILINOGEN UR QL STRIP: NORMAL
WBC NRBC COR # BLD: 7.01 10*3/MM3 (ref 4.8–10.8)

## 2017-03-27 PROCEDURE — 81025 URINE PREGNANCY TEST: CPT | Performed by: ORTHOPAEDIC SURGERY

## 2017-03-27 PROCEDURE — 81003 URINALYSIS AUTO W/O SCOPE: CPT | Performed by: ORTHOPAEDIC SURGERY

## 2017-03-27 PROCEDURE — 36415 COLL VENOUS BLD VENIPUNCTURE: CPT | Performed by: ORTHOPAEDIC SURGERY

## 2017-03-27 PROCEDURE — 93010 ELECTROCARDIOGRAM REPORT: CPT | Performed by: INTERNAL MEDICINE

## 2017-03-27 PROCEDURE — 80053 COMPREHEN METABOLIC PANEL: CPT | Performed by: ORTHOPAEDIC SURGERY

## 2017-03-27 PROCEDURE — 87081 CULTURE SCREEN ONLY: CPT | Performed by: ORTHOPAEDIC SURGERY

## 2017-03-27 PROCEDURE — 71020 HC CHEST PA AND LATERAL: CPT

## 2017-03-27 PROCEDURE — 85025 COMPLETE CBC W/AUTO DIFF WBC: CPT | Performed by: ORTHOPAEDIC SURGERY

## 2017-03-27 PROCEDURE — 85610 PROTHROMBIN TIME: CPT | Performed by: ORTHOPAEDIC SURGERY

## 2017-03-27 PROCEDURE — 85730 THROMBOPLASTIN TIME PARTIAL: CPT | Performed by: ORTHOPAEDIC SURGERY

## 2017-03-27 NOTE — PAT
"CALLED JOSE CARLOS ADEN CRNA R/T PT HX OF MI IN 1991.  PT STATED SHE HAS NOT SEEN ANYONE SINCE 2011.  STATES WAS SEEN IN [facility] BY A [employer] MD.  BE DEVINE TODAY.  REVIEWED PM WITH JOSE CARLOS AS WELL.  JOSE CARLOS STATES \"IT'S FINE.  WE DON'T NEED ANYTHING ELSE.\"   "

## 2017-03-29 LAB — MRSA SPEC QL CULT: NORMAL

## 2017-03-30 ENCOUNTER — OFFICE VISIT (OUTPATIENT)
Dept: INTERNAL MEDICINE | Facility: CLINIC | Age: 37
End: 2017-03-30

## 2017-03-30 VITALS
TEMPERATURE: 98.9 F | OXYGEN SATURATION: 98 % | SYSTOLIC BLOOD PRESSURE: 132 MMHG | HEART RATE: 73 BPM | WEIGHT: 285 LBS | HEIGHT: 70 IN | DIASTOLIC BLOOD PRESSURE: 70 MMHG | BODY MASS INDEX: 40.8 KG/M2

## 2017-03-30 DIAGNOSIS — G44.209 TENSION HEADACHE: Primary | ICD-10-CM

## 2017-03-30 PROBLEM — L91.8 SKIN TAG: Status: RESOLVED | Noted: 2017-02-21 | Resolved: 2017-03-30

## 2017-03-30 PROCEDURE — 99213 OFFICE O/P EST LOW 20 MIN: CPT | Performed by: INTERNAL MEDICINE

## 2017-03-30 RX ORDER — METHOCARBAMOL 500 MG/1
500 TABLET, FILM COATED ORAL 3 TIMES DAILY
Qty: 60 TABLET | Refills: 3 | Status: SHIPPED | OUTPATIENT
Start: 2017-03-30 | End: 2017-04-24

## 2017-03-30 NOTE — PROGRESS NOTES
"Chief Complaint   Patient presents with   • Headache     off and on since Saturday.     Subjective   Maria Murray is a 37 y.o. female.     HPI Comments: Here with complaint of HA on and off for past 4-5 days.   Pain is behind ears.  She was wearing glasses and pain was under the area where glasses were sitting  Today has some frontal pressure, but no HA.   She is also having pain in upper shoulders and back/neck.  She has pain when she turns head to left that radiates into left upper shoulder and neck area.     She is getting PT for her knee and he told her he could work on shoulders and neck with an order.        The following portions of the patient's history were reviewed and updated as appropriate: allergies, current medications, past family history, past medical history, past social history, past surgical history and problem list.    Review of Systems   Constitutional: Negative for fever.   HENT: Positive for congestion and sinus pressure. Negative for ear pain and sore throat.    Musculoskeletal: Positive for back pain, myalgias, neck pain and neck stiffness.   Allergic/Immunologic: Positive for environmental allergies.   Neurological: Positive for headaches.       Objective   /70  Pulse 73  Temp 98.9 °F (37.2 °C)  Ht 70\" (177.8 cm)  Wt 285 lb (129 kg)  LMP 03/06/2017  SpO2 98%  BMI 40.89 kg/m2  Body mass index is 40.89 kg/(m^2).  Physical Exam   Constitutional: She is oriented to person, place, and time. She appears well-developed and well-nourished.   HENT:   Head: Normocephalic.   Eyes: EOM are normal. Pupils are equal, round, and reactive to light.   Neck: Normal range of motion. Neck supple.   Musculoskeletal:   Pain over posterior occipital scalp at insertion of paraspinous cervical muscles, pain over the cervical muscles posteriorly with palpation, full range of motion of neck muscles but pain with forward flexion and right lateral neck flexion and right neck rotation into her left " sternocleidomastoid muscles   Neurological: She is alert and oriented to person, place, and time. She has normal reflexes. No cranial nerve deficit. Coordination normal.   Psychiatric: She has a normal mood and affect. Judgment normal.   Nursing note and vitals reviewed.      Assessment/Plan   Maria Murray is here today and the following problems have been addressed:      Maria was seen today for headache.    Diagnoses and all orders for this visit:    Tension headache    Other orders  -     methocarbamol (ROBAXIN) 500 MG tablet; Take 1 tablet by mouth 3 (Three) Times a Day.    Recommend heat to neck a few times daily  Given written order for PT on neck with her current therapist  Given robaxin 500 mg TID prn for neck spasm    RTC prn    Please note that portions of this note were completed with a voice recognition program.  Efforts were made to edit dictation, but occasionally words are mistranscribed.

## 2017-03-31 ENCOUNTER — ANESTHESIA (OUTPATIENT)
Dept: PERIOP | Facility: HOSPITAL | Age: 37
End: 2017-03-31

## 2017-03-31 ENCOUNTER — ANESTHESIA EVENT (OUTPATIENT)
Dept: PERIOP | Facility: HOSPITAL | Age: 37
End: 2017-03-31

## 2017-03-31 ENCOUNTER — HOSPITAL ENCOUNTER (OUTPATIENT)
Facility: HOSPITAL | Age: 37
Setting detail: HOSPITAL OUTPATIENT SURGERY
Discharge: HOME | End: 2017-03-31
Attending: ORTHOPAEDIC SURGERY | Admitting: ORTHOPAEDIC SURGERY

## 2017-03-31 VITALS
OXYGEN SATURATION: 98 % | DIASTOLIC BLOOD PRESSURE: 84 MMHG | SYSTOLIC BLOOD PRESSURE: 127 MMHG | RESPIRATION RATE: 18 BRPM | TEMPERATURE: 97.5 F | HEART RATE: 60 BPM

## 2017-03-31 DIAGNOSIS — N92.1 MENORRHAGIA WITH IRREGULAR CYCLE: ICD-10-CM

## 2017-03-31 DIAGNOSIS — M94.261 CHONDROMALACIA, KNEE, RIGHT: ICD-10-CM

## 2017-03-31 PROCEDURE — 25010000002 PROPOFOL 200 MG/20ML EMULSION: Performed by: NURSE ANESTHETIST, CERTIFIED REGISTERED

## 2017-03-31 PROCEDURE — S0260 H&P FOR SURGERY: HCPCS | Performed by: ORTHOPAEDIC SURGERY

## 2017-03-31 PROCEDURE — 29881 ARTHRS KNE SRG MNISECTMY M/L: CPT | Performed by: ORTHOPAEDIC SURGERY

## 2017-03-31 PROCEDURE — 25010000002 PROPOFOL 1000 MG/ML EMULSION

## 2017-03-31 PROCEDURE — 25010000002 EPINEPHRINE 1 MG/ML SOLUTION: Performed by: ORTHOPAEDIC SURGERY

## 2017-03-31 PROCEDURE — 25010000002 ONDANSETRON PER 1 MG: Performed by: ORTHOPAEDIC SURGERY

## 2017-03-31 RX ORDER — OXYCODONE HYDROCHLORIDE AND ACETAMINOPHEN 5; 325 MG/1; MG/1
1 TABLET ORAL EVERY 4 HOURS PRN
Status: CANCELLED | OUTPATIENT
Start: 2017-03-31 | End: 2017-04-10

## 2017-03-31 RX ORDER — CELECOXIB 100 MG/1
CAPSULE ORAL
Status: COMPLETED
Start: 2017-03-31 | End: 2017-03-31

## 2017-03-31 RX ORDER — ONDANSETRON 4 MG/1
4 TABLET, FILM COATED ORAL ONCE AS NEEDED
Status: DISCONTINUED | OUTPATIENT
Start: 2017-03-31 | End: 2017-03-31 | Stop reason: HOSPADM

## 2017-03-31 RX ORDER — LIDOCAINE HYDROCHLORIDE AND EPINEPHRINE BITARTRATE 20; .01 MG/ML; MG/ML
INJECTION, SOLUTION SUBCUTANEOUS
Status: DISCONTINUED
Start: 2017-03-31 | End: 2017-03-31 | Stop reason: HOSPADM

## 2017-03-31 RX ORDER — SODIUM CHLORIDE, SODIUM LACTATE, POTASSIUM CHLORIDE, CALCIUM CHLORIDE 600; 310; 30; 20 MG/100ML; MG/100ML; MG/100ML; MG/100ML
1000 INJECTION, SOLUTION INTRAVENOUS CONTINUOUS PRN
Status: DISCONTINUED | OUTPATIENT
Start: 2017-03-31 | End: 2017-03-31 | Stop reason: HOSPADM

## 2017-03-31 RX ORDER — BUPIVACAINE HYDROCHLORIDE AND EPINEPHRINE 5; 5 MG/ML; UG/ML
INJECTION, SOLUTION EPIDURAL; INTRACAUDAL; PERINEURAL
Status: DISCONTINUED
Start: 2017-03-31 | End: 2017-03-31 | Stop reason: HOSPADM

## 2017-03-31 RX ORDER — HYDROCODONE BITARTRATE AND ACETAMINOPHEN 5; 325 MG/1; MG/1
1-2 TABLET ORAL EVERY 4 HOURS PRN
Qty: 40 TABLET | Refills: 0 | Status: SHIPPED | OUTPATIENT
Start: 2017-03-31 | End: 2017-04-24

## 2017-03-31 RX ORDER — PROPOFOL 10 MG/ML
INJECTION, EMULSION INTRAVENOUS CONTINUOUS PRN
Status: DISCONTINUED | OUTPATIENT
Start: 2017-03-31 | End: 2017-03-31 | Stop reason: SURG

## 2017-03-31 RX ORDER — BUPIVACAINE HYDROCHLORIDE AND EPINEPHRINE 5; 5 MG/ML; UG/ML
INJECTION, SOLUTION EPIDURAL; INTRACAUDAL; PERINEURAL AS NEEDED
Status: DISCONTINUED | OUTPATIENT
Start: 2017-03-31 | End: 2017-03-31 | Stop reason: HOSPADM

## 2017-03-31 RX ORDER — PROPOFOL 10 MG/ML
INJECTION, EMULSION INTRAVENOUS AS NEEDED
Status: DISCONTINUED | OUTPATIENT
Start: 2017-03-31 | End: 2017-03-31 | Stop reason: SURG

## 2017-03-31 RX ORDER — LIDOCAINE HYDROCHLORIDE 10 MG/ML
INJECTION, SOLUTION INFILTRATION; PERINEURAL AS NEEDED
Status: DISCONTINUED | OUTPATIENT
Start: 2017-03-31 | End: 2017-03-31 | Stop reason: HOSPADM

## 2017-03-31 RX ORDER — CELECOXIB 100 MG/1
200 CAPSULE ORAL ONCE
Status: COMPLETED | OUTPATIENT
Start: 2017-03-31 | End: 2017-03-31

## 2017-03-31 RX ORDER — ACETAMINOPHEN 500 MG
1000 TABLET ORAL ONCE
Status: COMPLETED | OUTPATIENT
Start: 2017-03-31 | End: 2017-03-31

## 2017-03-31 RX ORDER — ACETAMINOPHEN 500 MG
TABLET ORAL
Status: COMPLETED
Start: 2017-03-31 | End: 2017-03-31

## 2017-03-31 RX ORDER — ONDANSETRON 2 MG/ML
4 INJECTION INTRAMUSCULAR; INTRAVENOUS ONCE AS NEEDED
Status: COMPLETED | OUTPATIENT
Start: 2017-03-31 | End: 2017-03-31

## 2017-03-31 RX ORDER — ONDANSETRON 2 MG/ML
INJECTION INTRAMUSCULAR; INTRAVENOUS
Status: DISCONTINUED
Start: 2017-03-31 | End: 2017-03-31 | Stop reason: HOSPADM

## 2017-03-31 RX ORDER — EPINEPHRINE 1 MG/ML
INJECTION INTRAMUSCULAR; INTRAVENOUS; SUBCUTANEOUS AS NEEDED
Status: DISCONTINUED | OUTPATIENT
Start: 2017-03-31 | End: 2017-03-31 | Stop reason: HOSPADM

## 2017-03-31 RX ORDER — SODIUM CHLORIDE 0.9 % (FLUSH) 0.9 %
1-10 SYRINGE (ML) INJECTION AS NEEDED
Status: DISCONTINUED | OUTPATIENT
Start: 2017-03-31 | End: 2017-03-31 | Stop reason: HOSPADM

## 2017-03-31 RX ADMIN — PROPOFOL 100 MCG/KG/MIN: 10 INJECTION, EMULSION INTRAVENOUS at 09:47

## 2017-03-31 RX ADMIN — ACETAMINOPHEN 1000 MG: 500 TABLET, COATED ORAL at 09:27

## 2017-03-31 RX ADMIN — SODIUM CHLORIDE 900 MG: 9 INJECTION, SOLUTION INTRAVENOUS at 09:38

## 2017-03-31 RX ADMIN — CELECOXIB 200 MG: 100 CAPSULE ORAL at 09:26

## 2017-03-31 RX ADMIN — PROPOFOL 150 MG: 10 INJECTION, EMULSION INTRAVENOUS at 09:41

## 2017-03-31 RX ADMIN — SODIUM CHLORIDE, POTASSIUM CHLORIDE, SODIUM LACTATE AND CALCIUM CHLORIDE: 600; 310; 30; 20 INJECTION, SOLUTION INTRAVENOUS at 09:38

## 2017-03-31 RX ADMIN — Medication 50 MG: at 09:39

## 2017-03-31 RX ADMIN — SODIUM CHLORIDE, POTASSIUM CHLORIDE, SODIUM LACTATE AND CALCIUM CHLORIDE 1000 ML: 600; 310; 30; 20 INJECTION, SOLUTION INTRAVENOUS at 09:17

## 2017-03-31 RX ADMIN — Medication 1000 MG: at 09:27

## 2017-03-31 RX ADMIN — ONDANSETRON 4 MG: 2 INJECTION INTRAMUSCULAR; INTRAVENOUS at 10:49

## 2017-03-31 NOTE — ANESTHESIA PREPROCEDURE EVALUATION
Anesthesia Evaluation     Patient summary reviewed and Nursing notes reviewed   NPO Status: > 8 hours   Airway   Mallampati: II  TM distance: >3 FB  Neck ROM: full  no difficulty expected  Dental    (+) poor dentition    Pulmonary - normal exam   (+) recent URI resolved,     ROS comment: FINDINGS: The heart is normal in size. The mediastinum is unremarkable.  The lungs are clear. There is no pneumothorax. There are no acute  osseous abnormalities.       IMPRESSION:  No acute cardiopulmonary process.  Cardiovascular   Exercise tolerance: good (4-7 METS)    ECG reviewed  Rhythm: regular  Rate: normal    (+) past MI  >12 months,     ROS comment: Pt states MI with child birth dt significant blood loss.       Neuro/Psych  (+) headaches, psychiatric history Anxiety and Depression,    GI/Hepatic/Renal/Endo    (+) obesity,  GERD well controlled, hypothyroidism,     Musculoskeletal     Abdominal    Substance History      OB/GYN          Other   (+) arthritis                               Anesthesia Plan    ASA 2     general, MAC and regional   (Postop acb if necessary)  intravenous induction   Anesthetic plan and risks discussed with patient.

## 2017-03-31 NOTE — ANESTHESIA POSTPROCEDURE EVALUATION
Patient: Maria Murray    Procedure Summary     Date Anesthesia Start Anesthesia Stop Room / Location    03/31/17 0938 1019  [facility] / [facility]       Procedure Diagnosis Surgeon Provider    RIGHT KNEE DIAGNOSTIC ARTHROSCOPY WITH PARTIAL LATERAL MENISECTOMY, CHONDRAL SHAVING PATELLA (Right Knee) Chondromalacia, knee, right  (Chondromalacia, knee, right [M94.261]) MD Adrián Jerez, DESIRE          Anesthesia Type: general  Last vitals  /68 (03/31/17 0923)    Temp 98.4 °F (36.9 °C) (03/31/17 0923)    Pulse 65 (03/31/17 0923)   Resp 16 (03/31/17 0923)    SpO2 99 % (03/31/17 0923)      Post Anesthesia Care and Evaluation    Patient location during evaluation: bedside  Patient participation: complete - patient participated  Level of consciousness: awake and alert  Pain score: 1  Pain management: satisfactory to patient  Airway patency: patent  Anesthetic complications: No anesthetic complications  PONV Status: none  Cardiovascular status: acceptable and hemodynamically stable  Respiratory status: acceptable  Hydration status: acceptable

## 2017-04-12 ENCOUNTER — OFFICE VISIT (OUTPATIENT)
Dept: ORTHOPEDIC SURGERY | Facility: CLINIC | Age: 37
End: 2017-04-12

## 2017-04-12 VITALS — HEIGHT: 70 IN | WEIGHT: 292.2 LBS | BODY MASS INDEX: 41.83 KG/M2 | RESPIRATION RATE: 18 BRPM

## 2017-04-12 DIAGNOSIS — M94.261 CHONDROMALACIA, KNEE, RIGHT: Primary | ICD-10-CM

## 2017-04-12 PROCEDURE — 99024 POSTOP FOLLOW-UP VISIT: CPT | Performed by: ORTHOPAEDIC SURGERY

## 2017-04-12 NOTE — PROGRESS NOTES
"Subjective   Patient ID: Maria Murray is a 37 y.o. female  Post-op of the Right Knee (Surgery Date: 3/31/2017; Diagnostic arthroscopy right knee with partial posterior horn medial meniscectomy, chondroplasty chondral shaving medial and lateral facets inferior pole right patella.)             History of Present Illness    Doing very well status post right knee partial medial meniscectomy and shaving chondroplasty patella    Review of Systems   Constitutional: Negative for diaphoresis, fever and unexpected weight change.   HENT: Negative for dental problem and sore throat.    Eyes: Negative for visual disturbance.   Respiratory: Negative for shortness of breath.    Cardiovascular: Negative for chest pain.   Gastrointestinal: Negative for abdominal pain, constipation, diarrhea, nausea and vomiting.   Genitourinary: Negative for difficulty urinating and frequency.   Musculoskeletal: Positive for arthralgias.   Neurological: Negative for headaches.   Hematological: Does not bruise/bleed easily.   All other systems reviewed and are negative.      Past Medical History:   Diagnosis Date   • Ankle sprain     LEFT AND RIGHT   • Anxiety    • Arthritis    • Bursitis of hip     RIGHT   • Celiac disease    • Chronic pain disorder    • Dislocation, hip     RIGHT   • Fracture, patella     LEFT   • GERD (gastroesophageal reflux disease)    • H/O exercise stress test     STATES \"IT WAS OK\"   • Heart attack     STATES HAD MI AFTER CHILDBIRTH-STATES DUE TO HYPOVOLEMIA   • Migraine    • Palpitations    • PCOS (polycystic ovarian syndrome)    • Sore throat    • Strep throat    • Subluxation of patella    • Tear of meniscus of knee     LEFT   • Thyroid disease    • Urinary symptom or sign    • Wears glasses         Past Surgical History:   Procedure Laterality Date   •  SECTION     • KNEE ARTHROSCOPY Right 3/31/2017    Procedure: RIGHT KNEE DIAGNOSTIC ARTHROSCOPY WITH PARTIAL LATERAL MENISECTOMY, CHONDRAL SHAVING " "PATELLA;  Surgeon: Dejan Rodas MD;  Location: [facility];  Service:    • KNEE SURGERY  2000     Left ATS   • OTHER SURGICAL HISTORY      Dilation And Curettage Of Cervical Stump       Family History   Problem Relation Age of Onset   • Bone cancer Other    • Diabetes Other    • Other Other      malignant neoplasm of breast   • Cancer Mother      breast   • Bone cancer Father    • Cancer Maternal Aunt      breast   • Diabetes Maternal Uncle        Social History     Social History   • Marital status:      Spouse name: N/A   • Number of children: N/A   • Years of education: N/A     Occupational History   • Not on file.     Social History Main Topics   • Smoking status: Never Smoker   • Smokeless tobacco: Never Used   • Alcohol use No   • Drug use: No   • Sexual activity: Defer      Comment:  vasectomy     Other Topics Concern   • Not on file     Social History Narrative       Allergies   Allergen Reactions   • Penicillins Hives       Objective   Resp 18  Ht 70\" (177.8 cm)  Wt 292 lb 3.2 oz (133 kg)  LMP 03/06/2017  BMI 41.93 kg/m2   Physical Exam  Constitutional: He is oriented to person, place, and time. He appears well-developed and well-nourished.   HENT:   Head: Normocephalic and atraumatic.   Eyes: EOM are normal. Pupils are equal, round, and reactive to light.   Neck: Normal range of motion. Neck supple.   Cardiovascular: Normal rate.    Pulmonary/Chest: Effort normal and breath sounds normal.   Abdominal: Soft.   Neurological: He is alert and oriented to person, place, and time.   Skin: Skin is warm and dry.   Psychiatric: He has a normal mood and affect.   Nursing note and vitals reviewed.       Ortho Exam   Right knee incisions well-healed sutures removed Steri-Strips applied, calf supple, full knee extension minimal tenderness over the medial joint line    Assessment/Plan   Review of Radiographic Studies:    No new imaging done today.      Procedures        Physical therapy referral " given      Recommendations/Plan:   Work/Activity Status: May perform usual activities as tolerated      Patient agreeable to call or return sooner for any concerns.             Impression:  Right knee status post partial medial meniscectomy and chondral shaving patella  Plan:  Plan for progression in rehabilitation recheck in 6 weeks for one final visit

## 2017-04-21 ENCOUNTER — APPOINTMENT (OUTPATIENT)
Dept: PREADMISSION TESTING | Facility: HOSPITAL | Age: 37
End: 2017-04-21

## 2017-04-24 ENCOUNTER — APPOINTMENT (OUTPATIENT)
Dept: PREADMISSION TESTING | Facility: HOSPITAL | Age: 37
End: 2017-04-24

## 2017-04-24 VITALS — HEIGHT: 71 IN | WEIGHT: 285 LBS | BODY MASS INDEX: 39.9 KG/M2

## 2017-04-24 LAB
ABO GROUP BLD: NORMAL
ANION GAP SERPL CALCULATED.3IONS-SCNC: 15.3 MMOL/L
B-HCG UR QL: NEGATIVE
BASOPHILS # BLD AUTO: 0.03 10*3/MM3 (ref 0–0.2)
BASOPHILS NFR BLD AUTO: 0.3 % (ref 0–2.5)
BILIRUB UR QL STRIP: NEGATIVE
BUN BLD-MCNC: 15 MG/DL (ref 7–20)
BUN/CREAT SERPL: 18.8 (ref 7.1–23.5)
CALCIUM SPEC-SCNC: 9.3 MG/DL (ref 8.4–10.2)
CHLORIDE SERPL-SCNC: 103 MMOL/L (ref 98–107)
CLARITY UR: CLEAR
CO2 SERPL-SCNC: 27 MMOL/L (ref 26–30)
COLOR UR: YELLOW
CREAT BLD-MCNC: 0.8 MG/DL (ref 0.6–1.3)
DEPRECATED RDW RBC AUTO: 40.9 FL (ref 37–54)
EOSINOPHIL # BLD AUTO: 0.15 10*3/MM3 (ref 0–0.7)
EOSINOPHIL NFR BLD AUTO: 1.7 % (ref 0–7)
ERYTHROCYTE [DISTWIDTH] IN BLOOD BY AUTOMATED COUNT: 13.2 % (ref 11.5–14.5)
GFR SERPL CREATININE-BSD FRML MDRD: 80 ML/MIN/1.73
GLUCOSE BLD-MCNC: 76 MG/DL (ref 74–98)
GLUCOSE UR STRIP-MCNC: NEGATIVE MG/DL
HCT VFR BLD AUTO: 39.4 % (ref 37–47)
HGB BLD-MCNC: 12.6 G/DL (ref 12–16)
HGB UR QL STRIP.AUTO: NEGATIVE
IMM GRANULOCYTES # BLD: 0.03 10*3/MM3 (ref 0–0.06)
IMM GRANULOCYTES NFR BLD: 0.3 % (ref 0–0.6)
KETONES UR QL STRIP: ABNORMAL
LEUKOCYTE ESTERASE UR QL STRIP.AUTO: NEGATIVE
LYMPHOCYTES # BLD AUTO: 1.94 10*3/MM3 (ref 0.6–3.4)
LYMPHOCYTES NFR BLD AUTO: 21.7 % (ref 10–50)
MCH RBC QN AUTO: 27.5 PG (ref 27–31)
MCHC RBC AUTO-ENTMCNC: 32 G/DL (ref 30–37)
MCV RBC AUTO: 85.8 FL (ref 81–99)
MONOCYTES # BLD AUTO: 0.61 10*3/MM3 (ref 0–0.9)
MONOCYTES NFR BLD AUTO: 6.8 % (ref 0–12)
NEUTROPHILS # BLD AUTO: 6.2 10*3/MM3 (ref 2–6.9)
NEUTROPHILS NFR BLD AUTO: 69.2 % (ref 37–80)
NITRITE UR QL STRIP: NEGATIVE
NRBC BLD MANUAL-RTO: 0 /100 WBC (ref 0–0)
PH UR STRIP.AUTO: 6 [PH] (ref 5–8)
PLATELET # BLD AUTO: 319 10*3/MM3 (ref 130–400)
PMV BLD AUTO: 12.4 FL (ref 6–12)
POTASSIUM BLD-SCNC: 4.3 MMOL/L (ref 3.5–5.1)
PROT UR QL STRIP: ABNORMAL
RBC # BLD AUTO: 4.59 10*6/MM3 (ref 4.2–5.4)
RH BLD: NEGATIVE
SODIUM BLD-SCNC: 141 MMOL/L (ref 137–145)
SP GR UR STRIP: 1.02 (ref 1–1.03)
UROBILINOGEN UR QL STRIP: ABNORMAL
WBC NRBC COR # BLD: 8.96 10*3/MM3 (ref 4.8–10.8)

## 2017-04-24 PROCEDURE — 86901 BLOOD TYPING SEROLOGIC RH(D): CPT | Performed by: OBSTETRICS & GYNECOLOGY

## 2017-04-24 PROCEDURE — 36415 COLL VENOUS BLD VENIPUNCTURE: CPT

## 2017-04-24 PROCEDURE — 86900 BLOOD TYPING SEROLOGIC ABO: CPT | Performed by: OBSTETRICS & GYNECOLOGY

## 2017-04-24 PROCEDURE — 81025 URINE PREGNANCY TEST: CPT | Performed by: OBSTETRICS & GYNECOLOGY

## 2017-04-24 PROCEDURE — 81003 URINALYSIS AUTO W/O SCOPE: CPT | Performed by: OBSTETRICS & GYNECOLOGY

## 2017-04-24 PROCEDURE — 85025 COMPLETE CBC W/AUTO DIFF WBC: CPT | Performed by: OBSTETRICS & GYNECOLOGY

## 2017-04-24 PROCEDURE — 80048 BASIC METABOLIC PNL TOTAL CA: CPT | Performed by: OBSTETRICS & GYNECOLOGY

## 2017-05-04 RX ORDER — LEVOTHYROXINE SODIUM 50 MCG
50 TABLET ORAL DAILY
Qty: 30 TABLET | Refills: 5 | Status: CANCELLED | OUTPATIENT
Start: 2017-05-04

## 2017-05-05 RX ORDER — LEVOTHYROXINE SODIUM 50 MCG
50 TABLET ORAL DAILY
Qty: 30 TABLET | Refills: 5 | Status: SHIPPED | OUTPATIENT
Start: 2017-05-05 | End: 2018-05-02

## 2017-06-01 ENCOUNTER — OFFICE VISIT (OUTPATIENT)
Dept: INTERNAL MEDICINE | Facility: CLINIC | Age: 37
End: 2017-06-01

## 2017-06-01 VITALS
HEIGHT: 71 IN | WEIGHT: 291 LBS | BODY MASS INDEX: 40.74 KG/M2 | TEMPERATURE: 99.8 F | OXYGEN SATURATION: 98 % | HEART RATE: 87 BPM | SYSTOLIC BLOOD PRESSURE: 126 MMHG | DIASTOLIC BLOOD PRESSURE: 80 MMHG

## 2017-06-01 DIAGNOSIS — J02.9 SORE THROAT: Primary | ICD-10-CM

## 2017-06-01 DIAGNOSIS — N75.0 INFECTED CYST OF BARTHOLIN'S GLAND DUCT: ICD-10-CM

## 2017-06-01 DIAGNOSIS — J02.0 STREP PHARYNGITIS: ICD-10-CM

## 2017-06-01 PROBLEM — G44.209 TENSION HEADACHE: Status: RESOLVED | Noted: 2017-03-30 | Resolved: 2017-06-01

## 2017-06-01 LAB
EXPIRATION DATE: ABNORMAL
INTERNAL CONTROL: ABNORMAL
Lab: ABNORMAL
S PYO AG THROAT QL: POSITIVE

## 2017-06-01 PROCEDURE — 87880 STREP A ASSAY W/OPTIC: CPT | Performed by: INTERNAL MEDICINE

## 2017-06-01 PROCEDURE — 99214 OFFICE O/P EST MOD 30 MIN: CPT | Performed by: INTERNAL MEDICINE

## 2017-06-01 RX ORDER — CEFUROXIME AXETIL 500 MG/1
500 TABLET ORAL 2 TIMES DAILY
Qty: 20 TABLET | Refills: 0 | Status: SHIPPED | OUTPATIENT
Start: 2017-06-01 | End: 2017-06-20

## 2017-06-01 NOTE — PROGRESS NOTES
"Chief Complaint   Patient presents with   • Sore Throat     possible fever off and on, and body aches since yesterday. Pt states she has cyst in groin area she would like looked at.      Subjective   Maria Murray is a 37 y.o. female.     HPI Comments: Exposed to sister in law with strep over past several days.     She also has had a labial cyst on left side.  She has had drainage of pus and blood over past week.  It has decreased in size and is less painful.   She has not applied heat or taken any warm baths.   She has a D&C and ablation scheduled for next Friday.     Sore Throat    This is a new problem. The current episode started yesterday. The problem has been waxing and waning. The pain is worse on the left side. The maximum temperature recorded prior to her arrival was 100.4 - 100.9 F. The fever has been present for less than 1 day. The pain is at a severity of 4/10. The pain is moderate. Associated symptoms include coughing and swollen glands. Pertinent negatives include no ear pain, headaches, trouble swallowing or vomiting. Associated symptoms comments: Fever and body aches. She has had exposure to strep. She has tried nothing for the symptoms.        The following portions of the patient's history were reviewed and updated as appropriate: allergies, current medications, past family history, past medical history, past social history, past surgical history and problem list.    Review of Systems   Constitutional: Positive for fever.   HENT: Positive for sore throat. Negative for ear pain and trouble swallowing.    Respiratory: Positive for cough.    Gastrointestinal: Negative for nausea and vomiting.   Musculoskeletal: Negative for myalgias.   Skin: Positive for rash.   Neurological: Negative for headaches.       Objective   /80  Pulse 87  Temp 99.8 °F (37.7 °C)  Ht 71\" (180.3 cm)  Wt 291 lb (132 kg)  SpO2 98%  BMI 40.59 kg/m2  Body mass index is 40.59 kg/(m^2).  Physical Exam "   Constitutional: She is oriented to person, place, and time. She appears well-developed and well-nourished.   HENT:   Head: Normocephalic and atraumatic.   Right Ear: External ear normal.   Left Ear: External ear normal.   Nose: Nose normal.   Tympanic membranes normal, oropharynx erythematous   Eyes: Conjunctivae and EOM are normal. Pupils are equal, round, and reactive to light.   Neck: Normal range of motion. Neck supple. No thyromegaly present.   Tender anterior cervical lymphadenopathy   Cardiovascular: Normal rate, regular rhythm and normal heart sounds.    No murmur heard.  Pulmonary/Chest: Effort normal and breath sounds normal. No respiratory distress.   Lymphadenopathy:     She has cervical adenopathy.   Neurological: She is alert and oriented to person, place, and time. No cranial nerve deficit.   Skin:   Left labia majora with approximately 1 cm cystic lesion that has 2 small puncture areas that are draining blood-tinged pustular discharge, there is no induration and area is fluctuant and soft   Psychiatric: She has a normal mood and affect. Judgment normal.   Nursing note and vitals reviewed.      Assessment/Plan   Maria Murray is here today and the following problems have been addressed:      Maria was seen today for sore throat.    Diagnoses and all orders for this visit:    Sore throat  -     POCT rapid strep A    Strep pharyngitis    Infected cyst of Bartholin's gland duct    Other orders  -     cefuroxime (CEFTIN) 500 MG tablet; Take 1 tablet by mouth 2 (Two) Times a Day.    Rapid strep is positive  Given Ceftin 500 mg twice daily for 10 days  Recommend salt water gargles, increase fluids and rest  Avoid public exposure for the next 24 hours  Recommend heat to area of cyst twice daily     Return to clinic if symptoms worsen or persist    Please note that portions of this note were completed with a voice recognition program.  Efforts were made to edit dictation, but occasionally words  are mistranscribed.

## 2017-06-02 ENCOUNTER — APPOINTMENT (OUTPATIENT)
Dept: PREADMISSION TESTING | Facility: HOSPITAL | Age: 37
End: 2017-06-02

## 2017-06-02 VITALS
WEIGHT: 285 LBS | BODY MASS INDEX: 39.9 KG/M2 | HEIGHT: 71 IN | DIASTOLIC BLOOD PRESSURE: 61 MMHG | SYSTOLIC BLOOD PRESSURE: 115 MMHG | HEART RATE: 90 BPM

## 2017-06-02 LAB
ANION GAP SERPL CALCULATED.3IONS-SCNC: 13.9 MMOL/L
BACTERIA UR QL AUTO: ABNORMAL /HPF
BILIRUB UR QL STRIP: ABNORMAL
BUN BLD-MCNC: 9 MG/DL (ref 7–20)
BUN/CREAT SERPL: 12.9 (ref 7.1–23.5)
CALCIUM SPEC-SCNC: 9.1 MG/DL (ref 8.4–10.2)
CHLORIDE SERPL-SCNC: 101 MMOL/L (ref 98–107)
CLARITY UR: ABNORMAL
CO2 SERPL-SCNC: 28 MMOL/L (ref 26–30)
COLOR UR: YELLOW
CREAT BLD-MCNC: 0.7 MG/DL (ref 0.6–1.3)
DEPRECATED RDW RBC AUTO: 39.2 FL (ref 37–54)
ERYTHROCYTE [DISTWIDTH] IN BLOOD BY AUTOMATED COUNT: 12.7 % (ref 11.5–14.5)
GFR SERPL CREATININE-BSD FRML MDRD: 94 ML/MIN/1.73
GLUCOSE BLD-MCNC: 98 MG/DL (ref 74–98)
GLUCOSE UR STRIP-MCNC: NEGATIVE MG/DL
HCT VFR BLD AUTO: 38.7 % (ref 37–47)
HGB BLD-MCNC: 12.2 G/DL (ref 12–16)
HGB UR QL STRIP.AUTO: ABNORMAL
HYALINE CASTS UR QL AUTO: ABNORMAL /LPF
KETONES UR QL STRIP: ABNORMAL
LEUKOCYTE ESTERASE UR QL STRIP.AUTO: NEGATIVE
MCH RBC QN AUTO: 26.9 PG (ref 27–31)
MCHC RBC AUTO-ENTMCNC: 31.5 G/DL (ref 30–37)
MCV RBC AUTO: 85.4 FL (ref 81–99)
MUCOUS THREADS URNS QL MICRO: ABNORMAL /HPF
NITRITE UR QL STRIP: NEGATIVE
PH UR STRIP.AUTO: 5.5 [PH] (ref 5–8)
PLATELET # BLD AUTO: 268 10*3/MM3 (ref 130–400)
PMV BLD AUTO: 11.8 FL (ref 6–12)
POTASSIUM BLD-SCNC: 3.9 MMOL/L (ref 3.5–5.1)
PROT UR QL STRIP: ABNORMAL
RBC # BLD AUTO: 4.53 10*6/MM3 (ref 4.2–5.4)
RBC # UR: ABNORMAL /HPF
REF LAB TEST METHOD: ABNORMAL
SODIUM BLD-SCNC: 139 MMOL/L (ref 137–145)
SP GR UR STRIP: 1.02 (ref 1–1.03)
SQUAMOUS #/AREA URNS HPF: ABNORMAL /HPF
UROBILINOGEN UR QL STRIP: ABNORMAL
WBC NRBC COR # BLD: 9.46 10*3/MM3 (ref 4.8–10.8)
WBC UR QL AUTO: ABNORMAL /HPF

## 2017-06-02 PROCEDURE — 36415 COLL VENOUS BLD VENIPUNCTURE: CPT

## 2017-06-02 PROCEDURE — 80048 BASIC METABOLIC PNL TOTAL CA: CPT | Performed by: OBSTETRICS & GYNECOLOGY

## 2017-06-02 PROCEDURE — 81001 URINALYSIS AUTO W/SCOPE: CPT | Performed by: OBSTETRICS & GYNECOLOGY

## 2017-06-02 PROCEDURE — 85027 COMPLETE CBC AUTOMATED: CPT | Performed by: OBSTETRICS & GYNECOLOGY

## 2017-06-02 RX ORDER — METHOCARBAMOL 500 MG/1
500 TABLET, FILM COATED ORAL 3 TIMES DAILY PRN
COMMUNITY
End: 2017-08-04

## 2017-06-02 NOTE — PAT
NOTIFIED MADAN AT DR DE ANDA'S OFFICE OF PATIENT BEING ON ANTIBIOTICS BID FOR DIAGNOSIS OF STREP ON 06-01-17.  MADAN REPORTED THAT SHE WOULD NOTIFY NEREYDA HERNANDEZ MA TO PASS ALONG TO MD.

## 2017-06-02 NOTE — DISCHARGE INSTRUCTIONS
Patient instructed on PAT PASS information.  Patient/family member verbalized understanding of instruction/education.Patient instructed on PAT PASS information.  Patient/family member verbalized understanding of instruction/education.

## 2017-06-08 PROCEDURE — S0260 H&P FOR SURGERY: HCPCS | Performed by: OBSTETRICS & GYNECOLOGY

## 2017-06-08 NOTE — H&P
"[facility]  Maria Murray  : 1980  MRN: [id number]  CSN: [id number]    History and Physical    Subjective   Maria Murray is a 37 y.o. year old  who present for surgery due to historyof menorrhagia.  Pt has completed childbearing.  Pt had recent TVS which showed normal uterus and ET 10.8mm; no fibroids. Pt desires trial with D&C/dx hysteroscopy and Novasure ablation.    History  Past Medical History:   Diagnosis Date   • Ankle sprain     LEFT AND RIGHT   • Anxiety    • Arthritis    • Bursitis of hip     RIGHT   • Celiac disease    • Chronic pain disorder    • Dislocation, hip     RIGHT   • Fracture     LEFT FOOT IN THE PAST   • Fracture, patella     LEFT   • GERD (gastroesophageal reflux disease)    • H/O exercise stress test     STATES \"IT WAS OK\"   • H/O seasonal allergies    • Heart attack     STATES HAD MI AFTER CHILDBIRTH-STATES DUE TO HYPOVOLEMIA   • History of transfusion     REPORTS DID NOT HAVE A REACTION TO TRANSFUSION   • Migraine    • Palpitations    • PCOS (polycystic ovarian syndrome)    • Sore throat    • Strep throat    • Subluxation of patella    • Tear of meniscus of knee     LEFT   • Thyroid disease    • Urinary symptom or sign    • Wears glasses      No current facility-administered medications on file prior to encounter.      Current Outpatient Prescriptions on File Prior to Encounter   Medication Sig Dispense Refill   • lamoTRIgine (LaMICtal) 100 MG tablet Take 100 mg by mouth 2 (Two) Times a Day.     • venlafaxine (EFFEXOR) 37.5 MG tablet Take 37.5 mg by mouth 2 (Two) Times a Day.       Allergies   Allergen Reactions   • Other GI Intolerance     REPORTS ALLERGY TO GLUTEN     • Penicillins Hives     Past Surgical History:   Procedure Laterality Date   •  SECTION     • DILATATION AND CURETTAGE      PATIENT REPORTS X2   • ENDOSCOPY     • KNEE ARTHROSCOPY Right 3/31/2017    Procedure: RIGHT KNEE DIAGNOSTIC ARTHROSCOPY WITH PARTIAL LATERAL MENISECTOMY, " "CHONDRAL SHAVING PATELLA;  Surgeon: Dejan Rodas MD;  Location: [facility];  Service:    • KNEE SURGERY Left 2000     Left ATS   • WISDOM TOOTH EXTRACTION       Family History   Problem Relation Age of Onset   • Bone cancer Other    • Diabetes Other    • Other Other      malignant neoplasm of breast   • Cancer Mother      breast   • Bone cancer Father    • Cancer Maternal Aunt      breast   • Diabetes Maternal Uncle      Social History     Social History   • Marital status:      Spouse name: N/A   • Number of children: N/A   • Years of education: N/A     Social History Main Topics   • Smoking status: Former Smoker     Packs/day: 0.25     Years: 10.00     Types: Cigarettes   • Smokeless tobacco: Never Used      Comment: QUIT 5 YEARS AGO, REPORTS WAS ONLY SMOKING A PACK PER WEEK   • Alcohol use No   • Drug use: No   • Sexual activity: Defer      Comment:  vasectomy     Other Topics Concern   • Not on file     Social History Narrative     Review of Systems  The following systems were reviewed and negative:  constitution, eyes, ENT, respiratory, cardiovascular, gastrointestinal, genitourinary, integument, breast, hematologic / lymphatic, musculoskeletal, neurological, behavioral/psych, endocrine and allergies / immunologic     Objective  BP  120/70   Weight  284 lbs   Height  70 \"  Physical Exam:  General Appearance: alert, appears stated age and cooperative  Head: normocephalic, without obvious abnormality and atraumatic  Eyes: lids and lashes normal, conjunctivae and sclerae normal, no icterus, no pallor and corneas clear  Neck: suppple, trachea midline and no thyromegaly  Lungs: clear to auscultation, respirations regular, respirations even and respirations unlabored  Heart: regular rhythm and normal rate, normal S1, S2, no murmur, gallop, or rubs and no click  Abdomen: normal bowel sounds, no masses, no hepatomegaly, no splenomegaly, soft non-tender, no guarding and no rebound tenderness  Pelvic: Not " performed.  Extremities: moves extremities well, no edema, no cyanosis and no redness  Skin: no bleeding, bruising or rash and no lesions noted  Psych: normal mood and affect, oriented to person, time and place, thought content organized and appropriate judgment    Labs  Lab Results   Component Value Date     06/02/2017    HGB 12.2 06/02/2017    HCT 38.7 06/02/2017    WBC 9.46 06/02/2017     06/02/2017    K 3.9 06/02/2017     06/02/2017    CO2 28.0 06/02/2017    BUN 9 06/02/2017    CREATININE 0.70 06/02/2017    GLUCOSE 98 06/02/2017    ALBUMIN 4.40 03/27/2017    CALCIUM 9.1 06/02/2017    AST 23 03/27/2017    ALT 27 03/27/2017    BILITOT 0.7 03/27/2017      Lab Results   Component Value Date    SQUAMEPIUA 7-12 (A) 06/02/2017    SPECGRAVUR 1.025 06/02/2017    KETONESU Trace (A) 06/02/2017    BLOODU Moderate (2+) (A) 06/02/2017    LEUKOCYTESUR Negative 06/02/2017    NITRITEU Negative 06/02/2017    RBCUA 6-12 (A) 06/02/2017    WBCUA 0-2 (A) 06/02/2017    BACTERIA Trace (A) 06/02/2017        No results found for: URINECX     Assessment   1. Menorrhagia with irregular cycles     Plan   1. D&C/dx hysteroscopy/Novasure ablation.  2. ABx and DVT prophylaxis as indicated.  3. Risks, complications, benefits, and other alternatives discussed.  4. All questions answered and pt in agreement with plan.    Donna Fuentes M.D.  6/8/2017  6:53 PM

## 2017-06-09 ENCOUNTER — ANESTHESIA (OUTPATIENT)
Dept: PERIOP | Facility: HOSPITAL | Age: 37
End: 2017-06-09

## 2017-06-09 ENCOUNTER — ANESTHESIA EVENT (OUTPATIENT)
Dept: PERIOP | Facility: HOSPITAL | Age: 37
End: 2017-06-09

## 2017-06-09 ENCOUNTER — HOSPITAL ENCOUNTER (OUTPATIENT)
Facility: HOSPITAL | Age: 37
Setting detail: HOSPITAL OUTPATIENT SURGERY
Discharge: HOME | End: 2017-06-09
Attending: OBSTETRICS & GYNECOLOGY | Admitting: OBSTETRICS & GYNECOLOGY

## 2017-06-09 VITALS
HEART RATE: 64 BPM | SYSTOLIC BLOOD PRESSURE: 136 MMHG | DIASTOLIC BLOOD PRESSURE: 82 MMHG | TEMPERATURE: 98.1 F | RESPIRATION RATE: 16 BRPM | OXYGEN SATURATION: 98 %

## 2017-06-09 DIAGNOSIS — N92.1 MENORRHAGIA WITH IRREGULAR CYCLE: ICD-10-CM

## 2017-06-09 LAB — B-HCG UR QL: NEGATIVE

## 2017-06-09 PROCEDURE — 25010000002 MIDAZOLAM PER 1 MG: Performed by: NURSE ANESTHETIST, CERTIFIED REGISTERED

## 2017-06-09 PROCEDURE — 25010000002 ONDANSETRON PER 1 MG: Performed by: NURSE ANESTHETIST, CERTIFIED REGISTERED

## 2017-06-09 PROCEDURE — 58563 HYSTEROSCOPY ABLATION: CPT | Performed by: OBSTETRICS & GYNECOLOGY

## 2017-06-09 PROCEDURE — 25010000002 PROPOFOL 10 MG/ML EMULSION: Performed by: NURSE ANESTHETIST, CERTIFIED REGISTERED

## 2017-06-09 PROCEDURE — 25010000002 FENTANYL CITRATE (PF) 100 MCG/2ML SOLUTION: Performed by: NURSE ANESTHETIST, CERTIFIED REGISTERED

## 2017-06-09 PROCEDURE — 81025 URINE PREGNANCY TEST: CPT | Performed by: OBSTETRICS & GYNECOLOGY

## 2017-06-09 PROCEDURE — 25010000002 DEXAMETHASONE PER 1 MG: Performed by: NURSE ANESTHETIST, CERTIFIED REGISTERED

## 2017-06-09 RX ORDER — MAGNESIUM HYDROXIDE 1200 MG/15ML
LIQUID ORAL AS NEEDED
Status: DISCONTINUED | OUTPATIENT
Start: 2017-06-09 | End: 2017-06-09 | Stop reason: HOSPADM

## 2017-06-09 RX ORDER — ACETAMINOPHEN 500 MG
TABLET ORAL
Status: COMPLETED
Start: 2017-06-09 | End: 2017-06-09

## 2017-06-09 RX ORDER — CELECOXIB 100 MG/1
CAPSULE ORAL
Status: COMPLETED
Start: 2017-06-09 | End: 2017-06-09

## 2017-06-09 RX ORDER — PROPOFOL 10 MG/ML
VIAL (ML) INTRAVENOUS AS NEEDED
Status: DISCONTINUED | OUTPATIENT
Start: 2017-06-09 | End: 2017-06-09 | Stop reason: SURG

## 2017-06-09 RX ORDER — FENTANYL CITRATE 50 UG/ML
INJECTION, SOLUTION INTRAMUSCULAR; INTRAVENOUS AS NEEDED
Status: DISCONTINUED | OUTPATIENT
Start: 2017-06-09 | End: 2017-06-09 | Stop reason: SURG

## 2017-06-09 RX ORDER — SODIUM CHLORIDE, SODIUM LACTATE, POTASSIUM CHLORIDE, CALCIUM CHLORIDE 600; 310; 30; 20 MG/100ML; MG/100ML; MG/100ML; MG/100ML
1000 INJECTION, SOLUTION INTRAVENOUS CONTINUOUS PRN
Status: DISCONTINUED | OUTPATIENT
Start: 2017-06-09 | End: 2017-06-09 | Stop reason: HOSPADM

## 2017-06-09 RX ORDER — HYDROCODONE BITARTRATE AND ACETAMINOPHEN 5; 325 MG/1; MG/1
1 TABLET ORAL EVERY 6 HOURS PRN
Qty: 30 TABLET | Refills: 0 | Status: SHIPPED | OUTPATIENT
Start: 2017-06-09 | End: 2017-06-20

## 2017-06-09 RX ORDER — HYDROCODONE BITARTRATE AND ACETAMINOPHEN 5; 325 MG/1; MG/1
2 TABLET ORAL EVERY 4 HOURS PRN
Status: CANCELLED | OUTPATIENT
Start: 2017-06-09 | End: 2017-06-19

## 2017-06-09 RX ORDER — LIDOCAINE HYDROCHLORIDE 10 MG/ML
INJECTION, SOLUTION INFILTRATION; PERINEURAL AS NEEDED
Status: DISCONTINUED | OUTPATIENT
Start: 2017-06-09 | End: 2017-06-09 | Stop reason: HOSPADM

## 2017-06-09 RX ORDER — LIDOCAINE HYDROCHLORIDE 10 MG/ML
INJECTION, SOLUTION INFILTRATION; PERINEURAL
Status: DISCONTINUED
Start: 2017-06-09 | End: 2017-06-09 | Stop reason: HOSPADM

## 2017-06-09 RX ORDER — PROMETHAZINE HYDROCHLORIDE 12.5 MG/1
12.5 TABLET ORAL ONCE AS NEEDED
Status: DISCONTINUED | OUTPATIENT
Start: 2017-06-09 | End: 2017-06-09 | Stop reason: HOSPADM

## 2017-06-09 RX ORDER — ONDANSETRON 2 MG/ML
4 INJECTION INTRAMUSCULAR; INTRAVENOUS ONCE AS NEEDED
Status: DISCONTINUED | OUTPATIENT
Start: 2017-06-09 | End: 2017-06-09 | Stop reason: HOSPADM

## 2017-06-09 RX ORDER — SODIUM CHLORIDE 0.9 % (FLUSH) 0.9 %
1-10 SYRINGE (ML) INJECTION AS NEEDED
Status: DISCONTINUED | OUTPATIENT
Start: 2017-06-09 | End: 2017-06-09 | Stop reason: HOSPADM

## 2017-06-09 RX ORDER — CELECOXIB 100 MG/1
200 CAPSULE ORAL ONCE
Status: COMPLETED | OUTPATIENT
Start: 2017-06-09 | End: 2017-06-09

## 2017-06-09 RX ORDER — ONDANSETRON HYDROCHLORIDE 8 MG/1
8 TABLET, FILM COATED ORAL EVERY 8 HOURS PRN
Qty: 30 TABLET | Refills: 0 | Status: SHIPPED | OUTPATIENT
Start: 2017-06-09 | End: 2017-06-20

## 2017-06-09 RX ORDER — ONDANSETRON 2 MG/ML
INJECTION INTRAMUSCULAR; INTRAVENOUS AS NEEDED
Status: DISCONTINUED | OUTPATIENT
Start: 2017-06-09 | End: 2017-06-09 | Stop reason: SURG

## 2017-06-09 RX ORDER — DEXAMETHASONE SODIUM PHOSPHATE 4 MG/ML
INJECTION, SOLUTION INTRA-ARTICULAR; INTRALESIONAL; INTRAMUSCULAR; INTRAVENOUS; SOFT TISSUE AS NEEDED
Status: DISCONTINUED | OUTPATIENT
Start: 2017-06-09 | End: 2017-06-09 | Stop reason: SURG

## 2017-06-09 RX ORDER — ONDANSETRON 4 MG/1
4 TABLET, ORALLY DISINTEGRATING ORAL AS NEEDED
COMMUNITY
Start: 2017-06-03 | End: 2017-06-20

## 2017-06-09 RX ORDER — SULFAMETHOXAZOLE AND TRIMETHOPRIM 800; 160 MG/1; MG/1
800 TABLET ORAL 2 TIMES DAILY
COMMUNITY
Start: 2017-06-03 | End: 2017-06-20

## 2017-06-09 RX ORDER — ACETAMINOPHEN 500 MG
1000 TABLET ORAL ONCE
Status: COMPLETED | OUTPATIENT
Start: 2017-06-09 | End: 2017-06-09

## 2017-06-09 RX ORDER — ONDANSETRON 4 MG/1
4 TABLET, FILM COATED ORAL ONCE AS NEEDED
Status: DISCONTINUED | OUTPATIENT
Start: 2017-06-09 | End: 2017-06-09 | Stop reason: HOSPADM

## 2017-06-09 RX ORDER — PROMETHAZINE HYDROCHLORIDE 25 MG/ML
12.5 INJECTION, SOLUTION INTRAMUSCULAR; INTRAVENOUS ONCE AS NEEDED
Status: DISCONTINUED | OUTPATIENT
Start: 2017-06-09 | End: 2017-06-09 | Stop reason: HOSPADM

## 2017-06-09 RX ORDER — MIDAZOLAM HYDROCHLORIDE 1 MG/ML
INJECTION INTRAMUSCULAR; INTRAVENOUS AS NEEDED
Status: DISCONTINUED | OUTPATIENT
Start: 2017-06-09 | End: 2017-06-09 | Stop reason: SURG

## 2017-06-09 RX ADMIN — PROPOFOL 50 MG: 10 INJECTION, EMULSION INTRAVENOUS at 10:22

## 2017-06-09 RX ADMIN — CELECOXIB 200 MG: 100 CAPSULE ORAL at 09:13

## 2017-06-09 RX ADMIN — LIDOCAINE HYDROCHLORIDE 60 MG: 20 INJECTION, SOLUTION INTRAVENOUS at 10:22

## 2017-06-09 RX ADMIN — MIDAZOLAM HYDROCHLORIDE 2 MG: 1 INJECTION, SOLUTION INTRAMUSCULAR; INTRAVENOUS at 10:19

## 2017-06-09 RX ADMIN — FENTANYL CITRATE 50 MCG: 50 INJECTION, SOLUTION INTRAMUSCULAR; INTRAVENOUS at 10:19

## 2017-06-09 RX ADMIN — ACETAMINOPHEN 1000 MG: 500 TABLET, FILM COATED ORAL at 09:13

## 2017-06-09 RX ADMIN — PROPOFOL 100 MG: 10 INJECTION, EMULSION INTRAVENOUS at 10:30

## 2017-06-09 RX ADMIN — FENTANYL CITRATE 50 MCG: 50 INJECTION, SOLUTION INTRAMUSCULAR; INTRAVENOUS at 10:22

## 2017-06-09 RX ADMIN — ONDANSETRON 4 MG: 2 INJECTION INTRAMUSCULAR; INTRAVENOUS at 10:35

## 2017-06-09 RX ADMIN — SODIUM CHLORIDE, POTASSIUM CHLORIDE, SODIUM LACTATE AND CALCIUM CHLORIDE: 600; 310; 30; 20 INJECTION, SOLUTION INTRAVENOUS at 09:57

## 2017-06-09 RX ADMIN — DEXAMETHASONE SODIUM PHOSPHATE 4 MG: 4 INJECTION, SOLUTION INTRAMUSCULAR; INTRAVENOUS at 10:35

## 2017-06-09 RX ADMIN — PROPOFOL 50 MG: 10 INJECTION, EMULSION INTRAVENOUS at 10:41

## 2017-06-09 RX ADMIN — PROPOFOL 50 MG: 10 INJECTION, EMULSION INTRAVENOUS at 10:27

## 2017-06-09 RX ADMIN — Medication 1000 MG: at 09:13

## 2017-06-09 RX ADMIN — PROPOFOL 50 MG: 10 INJECTION, EMULSION INTRAVENOUS at 10:47

## 2017-06-09 NOTE — OP NOTE
Jorge Murray  : 1980  MRN: [id number]  CSN: [id number]  Date: 2017    Operative Report    DILATATION AND CURETTAGE HYSTEROSCOPY NOVASURE ENDOMETRIAL ABLATION      Pre-op Diagnosis:  Menorrhagia with irregular cycle [N92.1]   Post-op Diagnosis:  Post-Op Diagnosis Codes:     * Menorrhagia with irregular cycle [N92.1]   Procedure: Procedure(s):  DILATATION AND CURETTAGE HYSTEROSCOPY NOVASURE ENDOMETRIAL ABLATION   Surgeon: Donna Fuentes M.D.   Assist: none   Anesthesia: Monitor Anesthesia Care with 1% lidocaine paracervical block   Estimated Blood Loss: 10 mls   ABx: none   Specimens:  endometrial   Findings: Grossly normal endometrium with moderate amount of tissue and blood   Complications: none   Indications: See H&P     Description of Procedure:  After the appropriate time out and adequate dosing of her anesthesia, the patient had been prepped and draped in the usual sterile fashion.  She was placed in the dorsal lithotomy position using Alex stirrups.  The bladder had been drained with a red rubber catheter per nursing.  A weighted speculum was placed in the vagina.  The anterior lip of the cervix was grasped with a single-tooth tenaculum.  The cervix was injected at the 3 o'clock and 9 o'clock position with 1% lidocaine plain without any complications.  The cervix was then progressively dilated using Padilla dilators.  Rigid hysteroscopy was then performed with the above findings noted.  Sharp curettings were then obtained with a good cry throughout with tissue retrieved and sent for pathologic specimen.  The endometrial cavity length was assessed at 6.5 cms and the width had been assessed at 4.3 cms.  After adequate CO2 testing, the NovaSure ablation was performed for a total time of 1 minutes and 0 seconds with a  power setting of 154 douglas. The device was removed.  Repeat hysteroscopy revealed good ablation extending to the cornu as well as the lower uterine segment.  The  cervical tenaculum was removed and the cervix was noted to be hemostatic after the application of 2-0 chromic suture in a figure-of-eight fashion.  All instrument and sponge counts were correct at the end of the procedure.  The patient tolerated the procedure well.  There were no complications.  She was taken to the postoperative recovery room in stable condition.    Donna Fuentes M.D.  6/9/2017  10:50 AM

## 2017-06-09 NOTE — ANESTHESIA PREPROCEDURE EVALUATION
Anesthesia Evaluation     NPO Solid Status: > 8 hours  NPO Liquid Status: > 8 hours     Airway   Mallampati: I  TM distance: >3 FB  Neck ROM: full  no difficulty expected  Dental    (+) poor dentition        Pulmonary - normal exam   (+) recent URI resolved,   Cardiovascular - normal exam    (+) past MI (Type II MI caused by hypovolemia/hemorrhage) ,       Neuro/Psych  (+) headaches, psychiatric history Anxiety and Depression,    GI/Hepatic/Renal/Endo    (+) morbid obesity, GERD, hypothyroidism,     Musculoskeletal     Abdominal   (+) obese,    Substance History      OB/GYN          Other   (+) arthritis                                   Anesthesia Plan    ASA 3     MAC   (PO meds tylenol,celebrex)  intravenous induction   Anesthetic plan and risks discussed with patient.

## 2017-06-09 NOTE — DISCHARGE INSTRUCTIONS
To assist you in voiding:  Drink plenty of fluids  Listen to running water while attempting to void.    If you are unable to urinate and you have an uncomfortable urge to void or it has been   6 hours since you were discharged, return to the Emergency Room

## 2017-06-09 NOTE — ANESTHESIA POSTPROCEDURE EVALUATION
Patient: Maria Murray    Procedure Summary     Date Anesthesia Start Anesthesia Stop Room / Location    06/09/17 1017 1059 [facility] OR 2 /  [facility] OR       Procedure Diagnosis Surgeon Provider    DILATATION AND CURETTAGE HYSTEROSCOPY NOVASURE ENDOMETRIAL ABLATION (N/A Vagina) Menorrhagia with irregular cycle  (Menorrhagia with irregular cycle [N92.1]) MD Geovanna Roe, DESIRE          Anesthesia Type: MAC  Last vitals  /81 (06/09/17 1058)    Temp 99.1 °F (37.3 °C) (06/09/17 1058)    Pulse 70 (06/09/17 1058)   Resp 16 (06/09/17 1058)    SpO2 99 % (06/09/17 1058)      Post Anesthesia Care and Evaluation    Patient location during evaluation: PHASE II  Patient participation: complete - patient participated  Level of consciousness: awake and alert  Pain score: 0  Pain management: satisfactory to patient  Airway patency: patent  Anesthetic complications: No anesthetic complications  PONV Status: none  Cardiovascular status: acceptable and stable  Respiratory status: acceptable  Hydration status: acceptable

## 2017-06-13 LAB
LAB AP CASE REPORT: NORMAL
Lab: NORMAL
PATH REPORT.FINAL DX SPEC: NORMAL

## 2017-06-20 ENCOUNTER — OFFICE VISIT (OUTPATIENT)
Dept: OBSTETRICS AND GYNECOLOGY | Facility: CLINIC | Age: 37
End: 2017-06-20

## 2017-06-20 VITALS
HEIGHT: 71 IN | DIASTOLIC BLOOD PRESSURE: 60 MMHG | BODY MASS INDEX: 41.02 KG/M2 | SYSTOLIC BLOOD PRESSURE: 122 MMHG | WEIGHT: 293 LBS

## 2017-06-20 DIAGNOSIS — Z09 POSTOP CHECK: Primary | ICD-10-CM

## 2017-06-20 PROCEDURE — 99024 POSTOP FOLLOW-UP VISIT: CPT | Performed by: MIDWIFE

## 2017-06-20 NOTE — PROGRESS NOTES
"Subjective   Chief Complaint   Patient presents with   • Post-op     11 days post op D&C hysteroscopy and ablation, no complaints      Maria Murray is a 37 y.o. year old  presenting to be seen for her post-operative visit.  Currently she reports pain is well controlled and no bleeding.    The pathology results from her procedure are in Maria's record and are benign.      OTHER COMPLAINTS:  Nothing else    The following portions of the patient's history were reviewed and updated as appropriate:current medications, allergies and past surgical history       Objective   /60  Ht 71\" (180.3 cm)  Wt 294 lb (133 kg)  LMP 2017 (Exact Date)  Breastfeeding? No  BMI 41 kg/m2    General:  well developed; well nourished  no acute distress   Abdomen: Not performed.   Pelvis: Not performed.          Assessment   1. S/P endometrial ablation and hysteroscopy     Plan   1. May return to full activity with no restrictions  2. Meds were prescribed - no  3. Follow up 1 year    No orders of the defined types were placed in this encounter.       This note was electronically signed.  Fany Guevara, APRN  2017  "

## 2017-06-26 ENCOUNTER — OFFICE VISIT (OUTPATIENT)
Dept: ORTHOPEDIC SURGERY | Facility: CLINIC | Age: 37
End: 2017-06-26

## 2017-06-26 VITALS — WEIGHT: 293 LBS | HEIGHT: 71 IN | RESPIRATION RATE: 16 BRPM | BODY MASS INDEX: 41.02 KG/M2

## 2017-06-26 DIAGNOSIS — M94.261 CHONDROMALACIA, KNEE, RIGHT: Primary | ICD-10-CM

## 2017-06-26 PROCEDURE — 99024 POSTOP FOLLOW-UP VISIT: CPT | Performed by: ORTHOPAEDIC SURGERY

## 2017-06-26 NOTE — PROGRESS NOTES
"Subjective   Patient ID: Maria Murray is a 37 y.o. female  Follow-up and Post-op of the Right Knee (Surgery Date: 3/31/2017; Diagnostic arthroscopy right knee with partial posterior horn medial meniscectomy, chondroplasty chondral shaving medial and lateral facets inferior pole right patella.))             History of Present Illness  Patient was unable to attend therapy due to family illness with her mother she traveled to [facility] is now back on a bike to resume therapy.  Pain occurs anteriorly to knee with squatting and prolonged sitting and driving.  No recent trauma.  Does not currently take anti-inflammatory medication.      Review of Systems   Constitutional: Negative for diaphoresis, fever and unexpected weight change.   HENT: Negative for dental problem and sore throat.    Eyes: Negative for visual disturbance.   Respiratory: Negative for shortness of breath.    Cardiovascular: Negative for chest pain.   Gastrointestinal: Negative for abdominal pain, constipation, diarrhea, nausea and vomiting.   Genitourinary: Negative for difficulty urinating and frequency.   Musculoskeletal: Positive for arthralgias.   Neurological: Negative for headaches.   Hematological: Does not bruise/bleed easily.   All other systems reviewed and are negative.      Past Medical History:   Diagnosis Date   • Ankle sprain     LEFT AND RIGHT   • Anxiety    • Arthritis    • Bursitis of hip     RIGHT   • Celiac disease    • Chronic pain disorder    • Dislocation, hip     RIGHT   • Fracture     LEFT FOOT IN THE PAST   • Fracture, patella     LEFT   • GERD (gastroesophageal reflux disease)    • H/O exercise stress test     STATES \"IT WAS OK\"   • H/O seasonal allergies    • Heart attack 2011    STATES HAD MI AFTER CHILDBIRTH-STATES DUE TO HYPOVOLEMIA   • History of transfusion     REPORTS DID NOT HAVE A REACTION TO TRANSFUSION   • Migraine    • Palpitations    • PCOS (polycystic ovarian syndrome)    • Sore throat    • " "Strep throat    • Subluxation of patella    • Tear of meniscus of knee     LEFT   • Thyroid disease    • Urinary symptom or sign    • Wears glasses         Past Surgical History:   Procedure Laterality Date   •  SECTION     • D&C HYSTEROSCOPY ENDOMETRIAL ABLATION N/A 2017    Procedure: DILATATION AND CURETTAGE HYSTEROSCOPY NOVASURE ENDOMETRIAL ABLATION;  Surgeon: Donna Fuentes MD;  Location: [facility] OR;  Service:    • DILATATION AND CURETTAGE      PATIENT REPORTS X2   • ENDOSCOPY     • KNEE ARTHROSCOPY Right 3/31/2017    Procedure: RIGHT KNEE DIAGNOSTIC ARTHROSCOPY WITH PARTIAL LATERAL MENISECTOMY, CHONDRAL SHAVING PATELLA;  Surgeon: Dejan Rodas MD;  Location: [facility] OR;  Service:    • KNEE SURGERY Left      Left ATS   • WISDOM TOOTH EXTRACTION         Family History   Problem Relation Age of Onset   • Bone cancer Other    • Diabetes Other    • Other Other      malignant neoplasm of breast   • Cancer Mother      breast   • Bone cancer Father    • Cancer Maternal Aunt      breast   • Diabetes Maternal Uncle        Social History     Social History   • Marital status:      Spouse name: N/A   • Number of children: N/A   • Years of education: N/A     Occupational History   • Not on file.     Social History Main Topics   • Smoking status: Former Smoker     Packs/day: 0.25     Years: 10.00     Types: Cigarettes   • Smokeless tobacco: Never Used      Comment: QUIT 5 YEARS AGO, REPORTS WAS ONLY SMOKING A PACK PER WEEK   • Alcohol use No   • Drug use: No   • Sexual activity: Defer      Comment:  vasectomy     Other Topics Concern   • Not on file     Social History Narrative       Allergies   Allergen Reactions   • Other GI Intolerance     REPORTS ALLERGY TO GLUTEN     • Penicillins Hives       Objective   Resp 16  Ht 71\" (180.3 cm)  Wt 295 lb 3.2 oz (134 kg)  LMP 2017 (Exact Date)  BMI 41.17 kg/m2   Physical Exam  Constitutional: He is oriented to person, place, and time. He appears " well-developed and well-nourished.   HENT:   Head: Normocephalic and atraumatic.   Eyes: EOM are normal. Pupils are equal, round, and reactive to light.   Neck: Normal range of motion. Neck supple.   Cardiovascular: Normal rate.    Pulmonary/Chest: Effort normal and breath sounds normal.   Abdominal: Soft.   Neurological: He is alert and oriented to person, place, and time.   Skin: Skin is warm and dry.   Psychiatric: He has a normal mood and affect.   Nursing note and vitals reviewed.       Ortho Exam     Right knee with no effusion full extension minimal patellofemoral crepitus, no medial or lateral joint line pain, negative Kathleen sign,  ligamentously stable      Assessment/Plan   Review of Radiographic Studies:    No new imaging done today.      Procedures        Physical therapy referral given      Recommendations/Plan:   Work/Activity Status: May perform usual activities as tolerated    Patient agreeable to call or return sooner for any concerns.             Impression:  Status post partial medial meniscectomy and chondral shaving patellofemoral region with anterior knee pain  Plan:  Refer to therapy, home exercise, icing recheck in 2 months

## 2017-08-04 ENCOUNTER — OFFICE VISIT (OUTPATIENT)
Dept: RETAIL CLINIC | Facility: CLINIC | Age: 37
End: 2017-08-04

## 2017-08-04 VITALS
HEART RATE: 89 BPM | OXYGEN SATURATION: 97 % | WEIGHT: 292 LBS | RESPIRATION RATE: 18 BRPM | BODY MASS INDEX: 40.73 KG/M2 | TEMPERATURE: 99.6 F

## 2017-08-04 DIAGNOSIS — J02.0 STREP PHARYNGITIS: Primary | ICD-10-CM

## 2017-08-04 LAB
EXPIRATION DATE: ABNORMAL
INTERNAL CONTROL: ABNORMAL
Lab: ABNORMAL
S PYO AG THROAT QL: POSITIVE

## 2017-08-04 PROCEDURE — 87880 STREP A ASSAY W/OPTIC: CPT | Performed by: NURSE PRACTITIONER

## 2017-08-04 PROCEDURE — 99213 OFFICE O/P EST LOW 20 MIN: CPT | Performed by: NURSE PRACTITIONER

## 2017-08-04 RX ORDER — CEFDINIR 300 MG/1
300 CAPSULE ORAL 2 TIMES DAILY
Qty: 20 CAPSULE | Refills: 0 | Status: SHIPPED | OUTPATIENT
Start: 2017-08-04 | End: 2017-08-14

## 2017-08-04 NOTE — PATIENT INSTRUCTIONS

## 2017-08-04 NOTE — PROGRESS NOTES
Sore Throat      Subjective   Maria Murray is a 37 y.o. female.     Sore Throat    This is a new problem. The current episode started yesterday. The problem has been rapidly worsening. Maximum temperature: maybe unsure. The pain is at a severity of 3/10. The pain is moderate. Associated symptoms include coughing. Pertinent negatives include no abdominal pain, congestion, diarrhea, ear discharge, ear pain, headaches, shortness of breath or vomiting. She has had exposure to strep (kids ill with strep ). She has had no exposure to mono. She has tried nothing for the symptoms. The treatment provided no relief.        Patient Active Problem List   Diagnosis   • Hypothyroidism   • Empty sella syndrome   • Arthritis   • Depression   • GERD without esophagitis   • Polycystic ovarian syndrome   • Celiac disease   • Environmental allergies   • Strep pharyngitis   • Infected cyst of Bartholin's gland duct       Allergies   Allergen Reactions   • Other GI Intolerance     REPORTS ALLERGY TO GLUTEN     • Penicillins Hives   • Zithromax [Azithromycin] Anxiety        Current Outpatient Prescriptions on File Prior to Visit   Medication Sig Dispense Refill   • lamoTRIgine (LaMICtal) 100 MG tablet Take 100 mg by mouth 2 (Two) Times a Day.     • SYNTHROID 50 MCG tablet Take 1 tablet by mouth Daily. 30 tablet 5   • venlafaxine (EFFEXOR) 37.5 MG tablet Take 37.5 mg by mouth 2 (Two) Times a Day.     • [DISCONTINUED] methocarbamol (ROBAXIN) 500 MG tablet Take 500 mg by mouth 3 (Three) Times a Day As Needed for Muscle Spasms.       No current facility-administered medications on file prior to visit.        Past Medical History:   Diagnosis Date   • Ankle sprain     LEFT AND RIGHT   • Anxiety    • Arthritis    • Bursitis of hip     RIGHT   • Celiac disease    • Chronic pain disorder    • Dislocation, hip     RIGHT   • Fracture     LEFT FOOT IN THE PAST   • Fracture, patella     LEFT   • GERD (gastroesophageal reflux disease)    •  "H/O exercise stress test     STATES \"IT WAS OK\"   • H/O seasonal allergies    • Heart attack     STATES HAD MI AFTER CHILDBIRTH-STATES DUE TO HYPOVOLEMIA   • History of transfusion     REPORTS DID NOT HAVE A REACTION TO TRANSFUSION   • Migraine    • Obstructive sleep apnea    • Palpitations    • PCOS (polycystic ovarian syndrome)    • Sore throat    • Strep throat    • Subluxation of patella    • Tear of meniscus of knee     LEFT   • Thyroid disease    • Urinary symptom or sign    • Wears glasses        Past Surgical History:   Procedure Laterality Date   •  SECTION     • D&C HYSTEROSCOPY ENDOMETRIAL ABLATION N/A 2017    Procedure: DILATATION AND CURETTAGE HYSTEROSCOPY NOVASURE ENDOMETRIAL ABLATION;  Surgeon: Donna Fuentes MD;  Location: [facility];  Service:    • DILATATION AND CURETTAGE      PATIENT REPORTS X2   • ENDOSCOPY     • KNEE ARTHROSCOPY Right 3/31/2017    Procedure: RIGHT KNEE DIAGNOSTIC ARTHROSCOPY WITH PARTIAL LATERAL MENISECTOMY, CHONDRAL SHAVING PATELLA;  Surgeon: Dejan Rodas MD;  Location: [facility];  Service:    • KNEE SURGERY Left 2000     Left ATS   • WISDOM TOOTH EXTRACTION         Family History   Problem Relation Age of Onset   • Cancer Mother      breast   • Bone cancer Father    • Cancer Maternal Aunt      breast   • Diabetes Maternal Uncle    • No Known Problems Brother    • Diabetes Maternal Grandfather    • Ovarian cancer Paternal Aunt        Social History     Social History   • Marital status:      Spouse name: N/A   • Number of children: N/A   • Years of education: N/A     Occupational History   • Not on file.     Social History Main Topics   • Smoking status: Former Smoker     Packs/day: 0.25     Years: 10.00     Types: Cigarettes     Quit date:    • Smokeless tobacco: Never Used      Comment: QUIT in , REPORTS WAS ONLY SMOKING A PACK PER WEEK   • Alcohol use No   • Drug use: No   • Sexual activity: Yes     Partners: Male     Birth control/ protection: " Surgical      Comment:  vasectomy     Other Topics Concern   • Not on file     Social History Narrative       Travel:  No recent travel within the last 21 days outside the [place] Denies recent travel to one of the following [place]ries:  [place], [place], [place], or [place].  Denies contact with anyone who has traveled to one of the following [place]  Countries: [place], [place], [place], or [place] within the last 21 days and is known or suspected to have Ebola.  Denies having had any contact with the human remains, blood or any bodily fluids of someone who is known or suspected to have Ebola within the last 21 days.     OB History      Para Term  AB TAB SAB Ectopic Multiple Living    6 5 5  1  1   5          Review of Systems   Constitutional: Positive for chills and fever (maybe unsure ). Negative for diaphoresis.   HENT: Positive for postnasal drip, sore throat and voice change (hoarseness ). Negative for congestion, ear discharge, ear pain, nosebleeds and rhinorrhea.    Respiratory: Positive for cough. Negative for shortness of breath and wheezing.    Gastrointestinal: Negative for abdominal pain, diarrhea, nausea and vomiting.   Musculoskeletal: Positive for myalgias (mildly worse than normal ).   Skin: Negative for rash.   Neurological: Positive for dizziness (mild; last night ). Negative for headaches.       Pulse 89  Temp 99.6 °F (37.6 °C) (Oral)   Resp 18  Wt 292 lb (132 kg)  LMP 2017 (Approximate)  SpO2 97%  Breastfeeding? Yes  BMI 40.73 kg/m2    Objective   Physical Exam   Constitutional: She is oriented to person, place, and time. She appears well-developed. She is cooperative. She appears ill. No distress.   HENT:   Head: Normocephalic and atraumatic.   Right Ear: Tympanic membrane, external ear and ear canal normal.   Left Ear: Tympanic membrane, external ear and ear canal normal.   Nose: Nose normal. Right sinus exhibits no maxillary sinus tenderness  and no frontal sinus tenderness. Left sinus exhibits no maxillary sinus tenderness and no frontal sinus tenderness.   Mouth/Throat: Uvula is midline and mucous membranes are normal. Posterior oropharyngeal erythema present. Tonsils are 1+ on the right. Tonsils are 1+ on the left. No tonsillar exudate.   Cardiovascular: Normal rate, regular rhythm and normal heart sounds.    Pulmonary/Chest: Effort normal and breath sounds normal.   Lymphadenopathy:        Head (right side): Tonsillar adenopathy present.        Head (left side): Tonsillar adenopathy present.     She has no cervical adenopathy.   Neurological: She is alert and oriented to person, place, and time.   Skin: Skin is warm, dry and intact. No rash noted.       Assessment/Plan   Maria was seen today for sore throat.    Diagnoses and all orders for this visit:    Strep pharyngitis  -     POC Rapid Strep A    Other orders  -     cefdinir (OMNICEF) 300 MG capsule; Take 1 capsule by mouth 2 (Two) Times a Day for 10 days.        Results for orders placed or performed in visit on 08/04/17   POC Rapid Strep A   Result Value Ref Range    Rapid Strep A Screen Positive (A) Negative, VALID, INVALID, Not Performed    Internal Control Passed Passed    Lot Number [id number]     Expiration Date 09/30/2018        Return if symptoms worsen or fail to improve.

## 2017-08-30 ENCOUNTER — OFFICE VISIT (OUTPATIENT)
Dept: ORTHOPEDIC SURGERY | Facility: CLINIC | Age: 37
End: 2017-08-30

## 2017-08-30 VITALS — HEIGHT: 71 IN | RESPIRATION RATE: 16 BRPM | BODY MASS INDEX: 40.81 KG/M2 | WEIGHT: 291.5 LBS

## 2017-08-30 DIAGNOSIS — M23.205: Primary | ICD-10-CM

## 2017-08-30 PROCEDURE — 99213 OFFICE O/P EST LOW 20 MIN: CPT | Performed by: ORTHOPAEDIC SURGERY

## 2017-08-30 RX ORDER — PROPRANOLOL HYDROCHLORIDE 10 MG/1
TABLET ORAL
Refills: 4 | COMMUNITY
Start: 2017-08-19 | End: 2017-11-08

## 2017-08-30 RX ORDER — IPRATROPIUM BROMIDE 42 UG/1
SPRAY, METERED NASAL
Refills: 3 | COMMUNITY
Start: 2017-08-04 | End: 2017-11-08

## 2017-08-30 RX ORDER — FEXOFENADINE HCL 180 MG/1
TABLET ORAL
Refills: 5 | COMMUNITY
Start: 2017-08-04 | End: 2018-05-02

## 2017-08-30 NOTE — PROGRESS NOTES
"Subjective   Patient ID: Maria Murray is a 37 y.o. female  Follow-up of the Right Knee (Surgery Date: 3/31/2017; Diagnostic arthroscopy right knee with partial posterior horn medial meniscectomy, chondroplasty chondral shaving medial and lateral facets inferior pole right patella)             History of Present Illness    Patient has done remarkably well with her right knee has no pain no swelling very satisfied with the improvement status post right knee partial meniscectomy and chondroplasty.  No swelling in the ankle no edema no paresthesias.  As more bilateral left posterior buttock hip pain and back pain which is followed by her PCP receiving chiropractic and physical therapy.    Review of Systems   Constitutional: Negative for diaphoresis, fever and unexpected weight change.   HENT: Negative for dental problem and sore throat.    Eyes: Negative for visual disturbance.   Respiratory: Negative for shortness of breath.    Cardiovascular: Negative for chest pain.   Gastrointestinal: Negative for abdominal pain, constipation, diarrhea, nausea and vomiting.   Genitourinary: Negative for difficulty urinating and frequency.   Neurological: Negative for headaches.   Hematological: Does not bruise/bleed easily.   All other systems reviewed and are negative.      Past Medical History:   Diagnosis Date   • Ankle sprain     LEFT AND RIGHT   • Anxiety    • Arthritis    • Bursitis of hip     RIGHT   • Celiac disease    • Chronic pain disorder    • Dislocation, hip     RIGHT   • Fracture     LEFT FOOT IN THE PAST   • Fracture, patella     LEFT   • GERD (gastroesophageal reflux disease)    • H/O exercise stress test     STATES \"IT WAS OK\"   • H/O seasonal allergies    • Heart attack 2011    STATES HAD MI AFTER CHILDBIRTH-STATES DUE TO HYPOVOLEMIA   • History of transfusion     REPORTS DID NOT HAVE A REACTION TO TRANSFUSION   • Migraine    • Obstructive sleep apnea    • Palpitations    • PCOS (polycystic ovarian " syndrome)    • Sore throat    • Strep throat    • Subluxation of patella    • Tear of meniscus of knee     LEFT   • Thyroid disease    • Urinary symptom or sign    • Wears glasses         Past Surgical History:   Procedure Laterality Date   •  SECTION     • D&C HYSTEROSCOPY ENDOMETRIAL ABLATION N/A 2017    Procedure: DILATATION AND CURETTAGE HYSTEROSCOPY NOVASURE ENDOMETRIAL ABLATION;  Surgeon: Donna Fuentes MD;  Location: [facility];  Service:    • DILATATION AND CURETTAGE      PATIENT REPORTS X2   • ENDOSCOPY     • KNEE ARTHROSCOPY Right 3/31/2017    Procedure: RIGHT KNEE DIAGNOSTIC ARTHROSCOPY WITH PARTIAL LATERAL MENISECTOMY, CHONDRAL SHAVING PATELLA;  Surgeon: Dejan Rodas MD;  Location: [facility];  Service:    • KNEE SURGERY Left      Left ATS   • WISDOM TOOTH EXTRACTION         Family History   Problem Relation Age of Onset   • Cancer Mother      breast   • Bone cancer Father    • Cancer Maternal Aunt      breast   • Diabetes Maternal Uncle    • No Known Problems Brother    • Diabetes Maternal Grandfather    • Ovarian cancer Paternal Aunt        Social History     Social History   • Marital status:      Spouse name: N/A   • Number of children: N/A   • Years of education: N/A     Occupational History   • Not on file.     Social History Main Topics   • Smoking status: Former Smoker     Packs/day: 0.25     Years: 10.00     Types: Cigarettes     Quit date:    • Smokeless tobacco: Never Used      Comment: QUIT in , REPORTS WAS ONLY SMOKING A PACK PER WEEK   • Alcohol use No   • Drug use: No   • Sexual activity: Yes     Partners: Male     Birth control/ protection: Surgical      Comment:  vasectomy     Other Topics Concern   • Not on file     Social History Narrative       All the above social hx, family hx, surgical history, allergies, ros & HPI reviewed.    Allergies   Allergen Reactions   • Other GI Intolerance     REPORTS ALLERGY TO GLUTEN     • Penicillins Hives   •  "Zithromax [Azithromycin] Anxiety       Objective   Resp 16  Ht 71\" (180.3 cm)  Wt 291 lb 8 oz (132 kg)  LMP 05/01/2017 (Approximate)  BMI 40.66 kg/m2   Physical Exam  Constitutional: He is oriented to person, place, and time. He appears well-developed and well-nourished.   HENT:   Head: Normocephalic and atraumatic.   Eyes: EOM are normal. Pupils are equal, round, and reactive to light.   Neck: Normal range of motion. Neck supple.   Cardiovascular: Normal rate.    Pulmonary/Chest: Effort normal and breath sounds normal.   Abdominal: Soft.   Neurological: He is alert and oriented to person, place, and time.   Skin: Skin is warm and dry.   Psychiatric: He has a normal mood and affect.   Nursing note and vitals reviewed.       Ortho Exam   Right knee with well-healed incisions full range of motion no warmth or swelling or focal tenderness, calf supple neurovascularly intact, no tenderness the quadriceps or patellar tendon regions, negative Kathleen sign.                                     Assessment/Plan   Review of Radiographic Studies:    No new imaging done today.      Procedures     Maria was seen today for follow-up.    Diagnoses and all orders for this visit:    Other old tear of medial meniscus of knee     Orthopedic activities reviewed and patient expressed appreciation and Risk, benefits, and merits of treatment alternatives reviewed with the patient and questions answered      Recommendations/Plan:   Work/Activity Status: May perform usual activities as tolerated    Patient agreeable to call or return sooner for any concerns.             Impression:   status post right knee arthroscopic partial meniscectomy with chondroplasty   Plan  home exercises return when needed    "

## 2017-11-08 ENCOUNTER — OFFICE VISIT (OUTPATIENT)
Dept: RETAIL CLINIC | Facility: CLINIC | Age: 37
End: 2017-11-08

## 2017-11-08 VITALS
OXYGEN SATURATION: 98 % | WEIGHT: 285 LBS | BODY MASS INDEX: 40.8 KG/M2 | HEIGHT: 70 IN | TEMPERATURE: 101.6 F | RESPIRATION RATE: 18 BRPM | HEART RATE: 96 BPM

## 2017-11-08 DIAGNOSIS — J10.1 INFLUENZA A: Primary | ICD-10-CM

## 2017-11-08 LAB
EXPIRATION DATE: ABNORMAL
EXPIRATION DATE: NORMAL
FLUAV AG NPH QL: POSITIVE
FLUBV AG NPH QL: NEGATIVE
INTERNAL CONTROL: ABNORMAL
INTERNAL CONTROL: NORMAL
Lab: ABNORMAL
Lab: NORMAL
S PYO AG THROAT QL: NEGATIVE

## 2017-11-08 PROCEDURE — 87804 INFLUENZA ASSAY W/OPTIC: CPT | Performed by: NURSE PRACTITIONER

## 2017-11-08 PROCEDURE — 99213 OFFICE O/P EST LOW 20 MIN: CPT | Performed by: NURSE PRACTITIONER

## 2017-11-08 PROCEDURE — 87880 STREP A ASSAY W/OPTIC: CPT | Performed by: NURSE PRACTITIONER

## 2017-11-08 RX ORDER — OSELTAMIVIR PHOSPHATE 75 MG/1
75 CAPSULE ORAL 2 TIMES DAILY
Qty: 10 CAPSULE | Refills: 0 | Status: SHIPPED | OUTPATIENT
Start: 2017-11-08 | End: 2017-11-13

## 2017-11-08 RX ORDER — ACETAMINOPHEN 500 MG
1000 TABLET ORAL ONCE
Status: DISCONTINUED | OUTPATIENT
Start: 2017-11-08 | End: 2019-05-10

## 2017-11-08 NOTE — PATIENT INSTRUCTIONS
"Influenza, Adult  Influenza, more commonly known as \"the flu,\" is a viral infection that primarily affects the respiratory tract. The respiratory tract includes organs that help you breathe, such as the lungs, nose, and throat. The flu causes many common cold symptoms, as well as a high fever and body aches.  The flu spreads easily from person to person (is contagious). Getting a flu shot (influenza vaccination) every year is the best way to prevent influenza.  CAUSES  Influenza is caused by a virus. You can catch the virus by:  · Breathing in droplets from an infected person's cough or sneeze.  · Touching something that was recently contaminated with the virus and then touching your mouth, nose, or eyes.  RISK FACTORS  The following factors may make you more likely to get the flu:  · Not cleaning your hands frequently with soap and water or alcohol-based hand .  · Having close contact with many people during cold and flu season.  · Touching your mouth, eyes, or nose without washing or sanitizing your hands first.  · Not drinking enough fluids or not eating a healthy diet.  · Not getting enough sleep or exercise.  · Being under a high amount of stress.  · Not getting a yearly (annual) flu shot.  You may be at a higher risk of complications from the flu, such as a severe lung infection (pneumonia), if you:  · Are over the age of 65.  · Are pregnant.  · Have a weakened disease-fighting system (immune system). You may have a weakened immune system if you:    Have HIV or AIDS.    Are undergoing chemotherapy.    Are taking medicines that reduce the activity of (suppress) the immune system.  · Have a long-term (chronic) illness, such as heart disease, kidney disease, diabetes, or lung disease.  · Have a liver disorder.  · Are obese.  · Have anemia.  SYMPTOMS  Symptoms of this condition typically last 4-10 days and may include:  · Fever.  · Chills.  · Headache, body aches, or muscle aches.  · Sore " throat.  · Cough.  · Runny or congested nose.  · Chest discomfort and cough.  · Poor appetite.  · Weakness or tiredness (fatigue).  · Dizziness.  · Nausea or vomiting.  DIAGNOSIS  This condition may be diagnosed based on your medical history and a physical exam. Your health care provider may do a nose or throat swab test to confirm the diagnosis.  TREATMENT  If influenza is detected early, you can be treated with antiviral medicine that can reduce the length of your illness and the severity of your symptoms. This medicine may be given by mouth (orally) or through an IV tube that is inserted in one of your veins.  The goal of treatment is to relieve symptoms by taking care of yourself at home. This may include taking over-the-counter medicines, drinking plenty of fluids, and adding humidity to the air in your home.  In some cases, influenza goes away on its own. Severe influenza or complications from influenza may be treated in a hospital.  HOME CARE INSTRUCTIONS  · Take over-the-counter and prescription medicines only as told by your health care provider.  · Use a cool mist humidifier to add humidity to the air in your home. This can make breathing easier.  · Rest as needed.  · Drink enough fluid to keep your urine clear or pale yellow.  · Cover your mouth and nose when you cough or sneeze.  · Wash your hands with soap and water often, especially after you cough or sneeze. If soap and water are not available, use hand .  · Stay home from work or school as told by your health care provider. Unless you are visiting your health care provider, try to avoid leaving home until your fever has been gone for 24 hours without the use of medicine.  · Keep all follow-up visits as told by your health care provider. This is important.  PREVENTION  · Getting an annual flu shot is the best way to avoid getting the flu. You may get the flu shot in late summer, fall, or winter. Ask your health care provider when you should  get your flu shot.  · Wash your hands often or use hand  often.  · Avoid contact with people who are sick during cold and flu season.  · Eat a healthy diet, drink plenty of fluids, get enough sleep, and exercise regularly.  SEEK MEDICAL CARE IF:  · You develop new symptoms.  · You have:    Chest pain.    Diarrhea.    A fever.  · Your cough gets worse.  · You produce more mucus.  · You feel nauseous or you vomit.  SEEK IMMEDIATE MEDICAL CARE IF:  · You develop shortness of breath or difficulty breathing.  · Your skin or nails turn a bluish color.  · You have severe pain or stiffness in your neck.  · You develop a sudden headache or sudden pain in your face or ear.  · You cannot stop vomiting.     This information is not intended to replace advice given to you by your health care provider. Make sure you discuss any questions you have with your health care provider.     Document Released: 12/15/2001 Document Revised: 04/10/2017 Document Reviewed: 10/11/2016  [employer] Patient Education ©2017 [employer]

## 2017-11-08 NOTE — PROGRESS NOTES
"Subjective   Maria Murray is a 37 y.o. female.     Fever    This is a new problem. The current episode started yesterday. The problem occurs constantly. The problem has been gradually worsening. Maximum temperature: tactile. Associated symptoms include congestion, coughing, headaches, muscle aches, sleepiness, a sore throat and wheezing. Pertinent negatives include no abdominal pain, chest pain, diarrhea, ear pain, nausea, rash or vomiting. Treatments tried: tylenol, motrin. The treatment provided mild relief.   Risk factors: sick contacts        Current Outpatient Prescriptions on File Prior to Visit   Medication Sig Dispense Refill   • fexofenadine (ALLEGRA) 180 MG tablet TAKE 1 TABLET EVERY DAY  5   • lamoTRIgine (LaMICtal) 100 MG tablet Take 100 mg by mouth 2 (Two) Times a Day.     • SYNTHROID 50 MCG tablet Take 1 tablet by mouth Daily. 30 tablet 5   • venlafaxine (EFFEXOR) 37.5 MG tablet Take 37.5 mg by mouth 2 (Two) Times a Day.     • [DISCONTINUED] ipratropium (ATROVENT) 0.06 % nasal spray USE 2 SPRAY IN EACH NOSTRIL TWICE DAILY  3   • [DISCONTINUED] propranolol (INDERAL) 10 MG tablet TAKE 1 TABLET EVERY SIX HOURS AS NEEDED  4     No current facility-administered medications on file prior to visit.        Allergies   Allergen Reactions   • Other GI Intolerance     REPORTS ALLERGY TO GLUTEN     • Penicillins Hives   • Zithromax [Azithromycin] Anxiety       Past Medical History:   Diagnosis Date   • Ankle sprain     LEFT AND RIGHT   • Anxiety    • Arthritis    • Bursitis of hip     RIGHT   • Celiac disease    • Chronic pain disorder    • Dislocation, hip     RIGHT   • Fracture     LEFT FOOT IN THE PAST   • Fracture, patella     LEFT   • GERD (gastroesophageal reflux disease)    • H/O exercise stress test     STATES \"IT WAS OK\"   • H/O seasonal allergies    • Heart attack 2011    STATES HAD MI AFTER CHILDBIRTH-STATES DUE TO HYPOVOLEMIA   • History of transfusion     REPORTS DID NOT HAVE A REACTION TO " TRANSFUSION   • Migraine    • Obstructive sleep apnea    • Palpitations    • PCOS (polycystic ovarian syndrome)    • Sore throat    • Strep throat    • Subluxation of patella    • Tear of meniscus of knee     LEFT   • Thyroid disease    • Urinary symptom or sign    • Wears glasses        Past Surgical History:   Procedure Laterality Date   •  SECTION     • D&C HYSTEROSCOPY ENDOMETRIAL ABLATION N/A 2017    Procedure: DILATATION AND CURETTAGE HYSTEROSCOPY NOVASURE ENDOMETRIAL ABLATION;  Surgeon: Donna Fuentes MD;  Location: [facility];  Service:    • DILATATION AND CURETTAGE      PATIENT REPORTS X2   • ENDOSCOPY     • KNEE ARTHROSCOPY Right 3/31/2017    Procedure: RIGHT KNEE DIAGNOSTIC ARTHROSCOPY WITH PARTIAL LATERAL MENISECTOMY, CHONDRAL SHAVING PATELLA;  Surgeon: Dejan Rodas MD;  Location: [facility] OR;  Service:    • KNEE SURGERY Left      Left ATS   • WISDOM TOOTH EXTRACTION         Family History   Problem Relation Age of Onset   • Cancer Mother      breast   • Bone cancer Father    • Cancer Maternal Aunt      breast   • Diabetes Maternal Uncle    • No Known Problems Brother    • Diabetes Maternal Grandfather    • Ovarian cancer Paternal Aunt        Social History     Social History   • Marital status:      Spouse name: N/A   • Number of children: N/A   • Years of education: N/A     Occupational History   • Not on file.     Social History Main Topics   • Smoking status: Former Smoker     Packs/day: 0.25     Years: 10.00     Types: Cigarettes     Quit date:    • Smokeless tobacco: Never Used      Comment: QUIT in , REPORTS WAS ONLY SMOKING A PACK PER WEEK   • Alcohol use No   • Drug use: No   • Sexual activity: Yes     Partners: Male     Birth control/ protection: Surgical      Comment:  vasectomy     Other Topics Concern   • Not on file     Social History Narrative       Review of Systems   Constitutional: Positive for activity change, appetite change, chills, diaphoresis and  "fever.   HENT: Positive for congestion, rhinorrhea and sore throat. Negative for ear pain.    Respiratory: Positive for cough and wheezing.    Cardiovascular: Negative for chest pain.   Gastrointestinal: Negative for abdominal pain, diarrhea, nausea and vomiting.   Skin: Negative for rash.   Neurological: Positive for headaches.       Pulse 96  Temp (!) 101.6 °F (38.7 °C)  Resp 18  Ht 70\" (177.8 cm)  Wt 285 lb (129 kg)  SpO2 98%  Breastfeeding? Yes  BMI 40.89 kg/m2    Objective   Physical Exam   Constitutional: She is oriented to person, place, and time. She appears well-developed and well-nourished. She is cooperative. She appears ill.   HENT:   Head: Normocephalic.   Right Ear: Tympanic membrane, external ear and ear canal normal.   Left Ear: Tympanic membrane, external ear and ear canal normal.   Nose: Right sinus exhibits no maxillary sinus tenderness and no frontal sinus tenderness. Left sinus exhibits no maxillary sinus tenderness and no frontal sinus tenderness.   Mouth/Throat: Uvula is midline and mucous membranes are normal. Posterior oropharyngeal erythema (mild) present.   Eyes: Conjunctivae, EOM and lids are normal.   Cardiovascular: Normal rate, regular rhythm and normal heart sounds.    Pulmonary/Chest: Effort normal and breath sounds normal. No accessory muscle usage. No respiratory distress.   Lymphadenopathy:     She has no cervical adenopathy.   Neurological: She is alert and oriented to person, place, and time.   Skin: Skin is warm, dry and intact.   Psychiatric: She has a normal mood and affect. Her speech is normal and behavior is normal.         Assessment/Plan   Maria was seen today for fever.    Diagnoses and all orders for this visit:    Influenza A  -     POC Rapid Strep A  -     POCT Influenza A/B  -     acetaminophen (TYLENOL) tablet 1,000 mg; Take 2 tablets by mouth 1 (One) Time.  -     oseltamivir (TAMIFLU) 75 MG capsule; Take 1 capsule by mouth 2 (Two) Times a Day for 5 " days.      Results for orders placed or performed in visit on 11/08/17   POC Rapid Strep A   Result Value Ref Range    Rapid Strep A Screen Negative Negative, VALID, INVALID, Not Performed    Internal Control Passed Passed    Lot Number [id number]     Expiration Date 3/19    POCT Influenza A/B   Result Value Ref Range    Rapid Influenza A Ag Positive     Rapid Influenza B Ag Negative     Internal Control Passed Passed    Lot Number [id number]     Expiration Date 12/18        Follow up with PCP or go to the nearest emergency room if symptoms worsen or fail to improve.

## 2017-12-06 ENCOUNTER — CLINICAL SUPPORT (OUTPATIENT)
Dept: INTERNAL MEDICINE | Facility: CLINIC | Age: 37
End: 2017-12-06

## 2017-12-06 DIAGNOSIS — Z20.818 STREP THROAT EXPOSURE: Primary | ICD-10-CM

## 2017-12-06 LAB
EXPIRATION DATE: ABNORMAL
INTERNAL CONTROL: ABNORMAL
Lab: ABNORMAL
S PYO AG THROAT QL: POSITIVE

## 2017-12-06 PROCEDURE — 87880 STREP A ASSAY W/OPTIC: CPT | Performed by: INTERNAL MEDICINE

## 2017-12-06 RX ORDER — CEFUROXIME AXETIL 500 MG/1
500 TABLET ORAL 2 TIMES DAILY
Qty: 20 TABLET | Refills: 0 | Status: SHIPPED | OUTPATIENT
Start: 2017-12-06 | End: 2018-02-14

## 2017-12-20 ENCOUNTER — OFFICE VISIT (OUTPATIENT)
Dept: RETAIL CLINIC | Facility: CLINIC | Age: 37
End: 2017-12-20

## 2017-12-20 VITALS
HEIGHT: 70 IN | TEMPERATURE: 98.3 F | WEIGHT: 278 LBS | OXYGEN SATURATION: 99 % | BODY MASS INDEX: 39.8 KG/M2 | HEART RATE: 86 BPM | RESPIRATION RATE: 16 BRPM

## 2017-12-20 DIAGNOSIS — H10.023 PINK EYE DISEASE OF BOTH EYES: Primary | ICD-10-CM

## 2017-12-20 DIAGNOSIS — R09.81 CONGESTION OF NASAL SINUS: ICD-10-CM

## 2017-12-20 PROCEDURE — 99213 OFFICE O/P EST LOW 20 MIN: CPT | Performed by: NURSE PRACTITIONER

## 2017-12-20 RX ORDER — FLUTICASONE PROPIONATE 50 UG/1
SPRAY, METERED NASAL
Refills: 5 | COMMUNITY
Start: 2017-11-16 | End: 2017-12-20 | Stop reason: ALTCHOICE

## 2017-12-20 RX ORDER — FLUTICASONE PROPIONATE 50 MCG
2 SPRAY, SUSPENSION (ML) NASAL NIGHTLY
Qty: 1 BOTTLE | Refills: 0 | Status: SHIPPED | OUTPATIENT
Start: 2017-12-20 | End: 2018-01-19

## 2017-12-20 RX ORDER — CIPROFLOXACIN HYDROCHLORIDE 3.5 MG/ML
2 SOLUTION/ DROPS TOPICAL 3 TIMES DAILY
Qty: 5 ML | Refills: 0 | Status: SHIPPED | OUTPATIENT
Start: 2017-12-20 | End: 2017-12-27

## 2017-12-20 RX ORDER — CETIRIZINE HYDROCHLORIDE 10 MG/1
10 TABLET ORAL DAILY
Qty: 30 TABLET | Refills: 1 | Status: SHIPPED | OUTPATIENT
Start: 2017-12-20 | End: 2018-02-14

## 2017-12-20 NOTE — PROGRESS NOTES
Subjective   Maria Murray is a 37 y.o. female.     Conjunctivitis    The current episode started yesterday. The problem occurs continuously. The problem has been gradually worsening. The problem is mild. Nothing relieves the symptoms. Nothing aggravates the symptoms. Associated symptoms include eye itching, congestion, rhinorrhea, sore throat, URI, eye discharge, eye pain (mild) and eye redness. Pertinent negatives include no orthopnea, no fever, no decreased vision, no double vision, no photophobia, no abdominal pain, no constipation, no diarrhea, no nausea, no vomiting, no ear discharge, no ear pain, no headaches, no hearing loss, no mouth sores, no stridor, no swollen glands, no cough, no wheezing and no rash. The eye pain is mild. Both eyes are affected.The eye pain is not associated with movement. The eyelid exhibits swelling and redness (mild).        Current Outpatient Prescriptions on File Prior to Visit   Medication Sig Dispense Refill   • cefuroxime (CEFTIN) 500 MG tablet Take 1 tablet by mouth 2 (Two) Times a Day. 20 tablet 0   • fexofenadine (ALLEGRA) 180 MG tablet TAKE 1 TABLET EVERY DAY  5   • lamoTRIgine (LaMICtal) 100 MG tablet Take 100 mg by mouth 2 (Two) Times a Day.     • SYNTHROID 50 MCG tablet Take 1 tablet by mouth Daily. 30 tablet 5   • venlafaxine (EFFEXOR) 37.5 MG tablet Take 37.5 mg by mouth 2 (Two) Times a Day.       Current Facility-Administered Medications on File Prior to Visit   Medication Dose Route Frequency Provider Last Rate Last Dose   • acetaminophen (TYLENOL) tablet 1,000 mg  1,000 mg Oral Once AKILAH Moran           Allergies   Allergen Reactions   • Other GI Intolerance     REPORTS ALLERGY TO GLUTEN     • Penicillins Hives   • Zithromax [Azithromycin] Anxiety       Past Medical History:   Diagnosis Date   • Ankle sprain     LEFT AND RIGHT   • Anxiety    • Arthritis    • Bursitis of hip     RIGHT   • Celiac disease    • Chronic pain disorder    •  "Dislocation, hip     RIGHT   • Fracture     LEFT FOOT IN THE PAST   • Fracture, patella     LEFT   • GERD (gastroesophageal reflux disease)    • H/O exercise stress test     STATES \"IT WAS OK\"   • H/O seasonal allergies    • Heart attack     STATES HAD MI AFTER CHILDBIRTH-STATES DUE TO HYPOVOLEMIA   • History of transfusion     REPORTS DID NOT HAVE A REACTION TO TRANSFUSION   • Migraine    • Obstructive sleep apnea    • Palpitations    • PCOS (polycystic ovarian syndrome)    • Sore throat    • Strep throat    • Subluxation of patella    • Tear of meniscus of knee     LEFT   • Thyroid disease    • Urinary symptom or sign    • Wears glasses        Past Surgical History:   Procedure Laterality Date   •  SECTION     • D&C HYSTEROSCOPY ENDOMETRIAL ABLATION N/A 2017    Procedure: DILATATION AND CURETTAGE HYSTEROSCOPY NOVASURE ENDOMETRIAL ABLATION;  Surgeon: Donna Fuentes MD;  Location: [facility];  Service:    • DILATATION AND CURETTAGE      PATIENT REPORTS X2   • ENDOSCOPY     • KNEE ARTHROSCOPY Right 3/31/2017    Procedure: RIGHT KNEE DIAGNOSTIC ARTHROSCOPY WITH PARTIAL LATERAL MENISECTOMY, CHONDRAL SHAVING PATELLA;  Surgeon: Dejan Rodas MD;  Location: [facility] OR;  Service:    • KNEE SURGERY Left      Left ATS   • WISDOM TOOTH EXTRACTION         Family History   Problem Relation Age of Onset   • Cancer Mother      breast   • Bone cancer Father    • Cancer Maternal Aunt      breast   • Diabetes Maternal Uncle    • No Known Problems Brother    • Diabetes Maternal Grandfather    • Ovarian cancer Paternal Aunt        Social History     Social History   • Marital status:      Spouse name: N/A   • Number of children: N/A   • Years of education: N/A     Occupational History   • Not on file.     Social History Main Topics   • Smoking status: Former Smoker     Packs/day: 0.25     Years: 10.00     Types: Cigarettes     Quit date:    • Smokeless tobacco: Never Used      Comment: QUIT in , " "REPORTS WAS ONLY SMOKING A PACK PER WEEK   • Alcohol use No   • Drug use: No   • Sexual activity: Yes     Partners: Male     Birth control/ protection: Surgical      Comment:  vasectomy     Other Topics Concern   • Not on file     Social History Narrative       Review of Systems   Constitutional: Negative for activity change, appetite change, chills, fatigue and fever.   HENT: Positive for congestion, rhinorrhea and sore throat. Negative for ear discharge, ear pain, hearing loss, mouth sores, sinus pain, sinus pressure and sneezing.    Eyes: Positive for pain (mild), discharge, redness and itching. Negative for double vision, photophobia and visual disturbance.   Respiratory: Negative for cough, wheezing and stridor.    Cardiovascular: Negative for orthopnea.   Gastrointestinal: Negative for abdominal pain, constipation, diarrhea, nausea and vomiting.   Skin: Negative for rash.   Allergic/Immunologic: Positive for environmental allergies.   Neurological: Negative for headaches.       Pulse 86  Temp 98.3 °F (36.8 °C) (Oral)   Resp 16  Ht 177.8 cm (70\")  Wt 126 kg (278 lb)  SpO2 99%  BMI 39.89 kg/m2    Objective   Physical Exam   Constitutional: She is oriented to person, place, and time. She appears well-developed and well-nourished. She is cooperative. She appears ill.   HENT:   Head: Normocephalic and atraumatic.   Right Ear: Tympanic membrane, external ear and ear canal normal.   Left Ear: External ear and ear canal normal. A middle ear effusion is present.   Nose: Rhinorrhea present. Right sinus exhibits no maxillary sinus tenderness and no frontal sinus tenderness. Left sinus exhibits no maxillary sinus tenderness and no frontal sinus tenderness.   Mouth/Throat: Uvula is midline and mucous membranes are normal. Posterior oropharyngeal erythema present.   Eyes: Lids are normal. Pupils are equal, round, and reactive to light. Left conjunctiva is injected.       Cardiovascular: Normal heart sounds.  "   Pulmonary/Chest: Effort normal and breath sounds normal.   Lymphadenopathy:     She has cervical adenopathy.   Neurological: She is alert and oriented to person, place, and time.   Skin: Skin is warm and dry. No rash noted.   Psychiatric: She has a normal mood and affect. Her speech is normal and behavior is normal.         Assessment/Plan   Diagnoses and all orders for this visit:    Pink eye disease of both eyes  -     fluticasone (FLONASE) 50 MCG/ACT nasal spray; 2 sprays into each nostril Every Night for 30 days. Administer 2 sprays in each nostril for each dose.  -     ciprofloxacin (CILOXAN) 0.3 % ophthalmic solution; Administer 2 drops to both eyes 3 (Three) Times a Day for 7 days.    Congestion of nasal sinus  -     cetirizine (zyrTEC) 10 MG tablet; Take 1 tablet by mouth Daily for 61 days.  -     fluticasone (FLONASE) 50 MCG/ACT nasal spray; 2 sprays into each nostril Every Night for 30 days. Administer 2 sprays in each nostril for each dose.        Results for orders placed or performed in visit on 12/06/17   POCT rapid strep A   Result Value Ref Range    Rapid Strep A Screen Positive (A) Negative, VALID, INVALID, Not Performed    Internal Control Passed Passed    Lot Number [id number]     Expiration Date 07/31/2018        Follow up with PCP or go to the nearest emergency room if symptoms worsen or fail to improve.

## 2018-02-14 ENCOUNTER — OFFICE VISIT (OUTPATIENT)
Dept: RETAIL CLINIC | Facility: CLINIC | Age: 38
End: 2018-02-14

## 2018-02-14 VITALS
WEIGHT: 282 LBS | TEMPERATURE: 98.2 F | OXYGEN SATURATION: 98 % | HEART RATE: 60 BPM | RESPIRATION RATE: 20 BRPM | BODY MASS INDEX: 40.37 KG/M2 | HEIGHT: 70 IN

## 2018-02-14 DIAGNOSIS — J02.9 SORE THROAT: ICD-10-CM

## 2018-02-14 DIAGNOSIS — H10.32 ACUTE BACTERIAL CONJUNCTIVITIS OF LEFT EYE: Primary | ICD-10-CM

## 2018-02-14 LAB
EXPIRATION DATE: NORMAL
INTERNAL CONTROL: NORMAL
Lab: NORMAL
S PYO AG THROAT QL: NEGATIVE

## 2018-02-14 PROCEDURE — 87880 STREP A ASSAY W/OPTIC: CPT | Performed by: NURSE PRACTITIONER

## 2018-02-14 PROCEDURE — 99213 OFFICE O/P EST LOW 20 MIN: CPT | Performed by: NURSE PRACTITIONER

## 2018-02-14 RX ORDER — CIPROFLOXACIN HYDROCHLORIDE 3.5 MG/ML
1 SOLUTION/ DROPS TOPICAL 4 TIMES DAILY
Qty: 10 ML | Refills: 0 | Status: SHIPPED | OUTPATIENT
Start: 2018-02-14 | End: 2018-03-20

## 2018-02-14 RX ORDER — FLUTICASONE PROPIONATE 50 MCG
2 SPRAY, SUSPENSION (ML) NASAL DAILY
COMMUNITY
End: 2019-05-10

## 2018-02-14 NOTE — PATIENT INSTRUCTIONS
Sore Throat  When you have a sore throat, your throat may:  · Hurt.  · Burn.  · Feel irritated.  · Feel scratchy.  Many things can cause a sore throat, including:  · An infection.  · Allergies.  · Dryness in the air.  · Smoke or pollution.  · Gastroesophageal reflux disease (GERD).  · A tumor.  A sore throat can be the first sign of another sickness. It can happen with other problems, like coughing or a fever. Most sore throats go away without treatment.  Follow these instructions at home:  · Take over-the-counter medicines only as told by your doctor.  · Drink enough fluids to keep your pee (urine) clear or pale yellow.  · Rest when you feel you need to.  · To help with pain, try:  ¨ Sipping warm liquids, such as broth, herbal tea, or warm water.  ¨ Eating or drinking cold or frozen liquids, such as frozen ice pops.  ¨ Gargling with a salt-water mixture 3-4 times a day or as needed. To make a salt-water mixture, add ½-1 tsp of salt in 1 cup of warm water. Mix it until you cannot see the salt anymore.  ¨ Sucking on hard candy or throat lozenges.  ¨ Putting a cool-mist humidifier in your bedroom at night.  ¨ Sitting in the bathroom with the door closed for 5-10 minutes while you run hot water in the shower.  · Do not use any tobacco products, such as cigarettes, chewing tobacco, and e-cigarettes. If you need help quitting, ask your doctor.  Contact a doctor if:  · You have a fever for more than 2-3 days.  · You keep having symptoms for more than 2-3 days.  · Your throat does not get better in 7 days.  · You have a fever and your symptoms suddenly get worse.  Get help right away if:  · You have trouble breathing.  · You cannot swallow fluids, soft foods, or your saliva.  · You have swelling in your throat or neck that gets worse.  · You keep feeling like you are going to throw up (vomit).  · You keep throwing up.  This information is not intended to replace advice given to you by your health care provider. Make sure  you discuss any questions you have with your health care provider.  Document Released: 09/26/2009 Document Revised: 08/13/2017 Document Reviewed: 10/07/2016  [employer] Interactive Patient Education © 2017 [employer]  Bacterial Conjunctivitis  Bacterial conjunctivitis is an infection of your conjunctiva. This is the clear membrane that covers the white part of your eye and the inner surface of your eyelid. This condition can make your eye:  · Red or pink.  · Itchy.  This condition is caused by bacteria. This condition spreads very easily from person to person (is contagious) and from one eye to the other eye.  Follow these instructions at home:  Medicines   · Take or apply your antibiotic medicine as told by your doctor. Do not stop taking or applying the antibiotic even if you start to feel better.  · Take or apply over-the-counter and prescription medicines only as told by your doctor.  · Do not touch your eyelid with the eye drop bottle or the ointment tube.  Managing discomfort   · Wipe any fluid from your eye with a warm, wet washcloth or a cotton ball.  · Place a cool, clean washcloth on your eye. Do this for 10-20 minutes, 3-4 times per day.  General instructions   · Do not wear contact lenses until the irritation is gone. Wear glasses until your doctor says it is okay to wear contacts.  · Do not wear eye makeup until your symptoms are gone. Throw away any old makeup.  · Change or wash your pillowcase every day.  · Do not share towels or washcloths with anyone.  · Wash your hands often with soap and water. Use paper towels to dry your hands.  · Do not touch or rub your eyes.  · Do not drive or use heavy machinery if your vision is blurry.  Contact a doctor if:  · You have a fever.  · Your symptoms do not get better after 10 days.  Get help right away if:  · You have a fever and your symptoms suddenly get worse.  · You have very bad pain when you move your eye.  · Your face:  ¨ Hurts.  ¨ Is red.  ¨ Is  swollen.  · You have sudden loss of vision.  This information is not intended to replace advice given to you by your health care provider. Make sure you discuss any questions you have with your health care provider.  Document Released: 09/26/2009 Document Revised: 05/25/2017 Document Reviewed: 09/29/2016  [employer] Interactive Patient Education © 2017 [employer]

## 2018-02-22 RX ORDER — CEFUROXIME AXETIL 500 MG/1
500 TABLET ORAL 2 TIMES DAILY
Qty: 20 TABLET | Refills: 0 | Status: SHIPPED | OUTPATIENT
Start: 2018-02-22 | End: 2018-03-04

## 2018-03-20 ENCOUNTER — OFFICE VISIT (OUTPATIENT)
Dept: RETAIL CLINIC | Facility: CLINIC | Age: 38
End: 2018-03-20

## 2018-03-20 VITALS
WEIGHT: 282 LBS | TEMPERATURE: 97.9 F | BODY MASS INDEX: 40.37 KG/M2 | RESPIRATION RATE: 16 BRPM | HEIGHT: 70 IN | HEART RATE: 83 BPM | OXYGEN SATURATION: 98 %

## 2018-03-20 DIAGNOSIS — H10.022 PINK EYE DISEASE OF LEFT EYE: Primary | ICD-10-CM

## 2018-03-20 PROCEDURE — 99213 OFFICE O/P EST LOW 20 MIN: CPT | Performed by: NURSE PRACTITIONER

## 2018-03-20 RX ORDER — POLYMYXIN B SULFATE AND TRIMETHOPRIM 1; 10000 MG/ML; [USP'U]/ML
1 SOLUTION OPHTHALMIC EVERY 4 HOURS
Qty: 10 ML | Refills: 0 | Status: SHIPPED | OUTPATIENT
Start: 2018-03-20 | End: 2018-03-27

## 2018-03-20 NOTE — PATIENT INSTRUCTIONS
Bacterial Conjunctivitis  Bacterial conjunctivitis is an infection of the clear membrane that covers the white part of your eye and the inner surface of your eyelid (conjunctiva). When the blood vessels in your conjunctiva become inflamed, your eye becomes red or pink, and it will probably feel itchy. Bacterial conjunctivitis spreads very easily from person to person (is contagious). It also spreads easily from one eye to the other eye.  What are the causes?  This condition is caused by several common bacteria. You may get the infection if you come into close contact with another person who is infected. You may also come into contact with items that are contaminated with the bacteria, such as a face towel, contact lens solution, or eye makeup.  What increases the risk?  This condition is more likely to develop in people who:  · Are exposed to other people who have the infection.  · Wear contact lenses.  · Have a sinus infection.  · Have had a recent eye injury or surgery.  · Have a weak body defense system (immune system).  · Have a medical condition that causes dry eyes.  What are the signs or symptoms?  Symptoms of this condition include:  · Eye redness.  · Tearing or watery eyes.  · Itchy eyes.  · Burning feeling in your eyes.  · Thick, yellowish discharge from an eye. This may turn into a crust on the eyelid overnight and cause your eyelids to stick together.  · Swollen eyelids.  · Blurred vision.  How is this diagnosed?  Your health care provider can diagnose this condition based on your symptoms and medical history. Your health care provider may also take a sample of discharge from your eye to find the cause of your infection. This is rarely done.  How is this treated?  Treatment for this condition includes:  · Antibiotic eye drops or ointment to clear the infection more quickly and prevent the spread of infection to others.  · Oral antibiotic medicines to treat infections that do not respond to drops or  ointments, or last longer than 10 days.  · Cool, wet cloths (cool compresses) placed on the eyes.  · Artificial tears applied 2-6 times a day.  Follow these instructions at home:  Medicines   · Take or apply your antibiotic medicine as told by your health care provider. Do not stop taking or applying the antibiotic even if you start to feel better.  · Take or apply over-the-counter and prescription medicines only as told by your health care provider.  · Be very careful to avoid touching the edge of your eyelid with the eye drop bottle or the ointment tube when you apply medicines to the affected eye. This will keep you from spreading the infection to your other eye or to other people.  Managing discomfort   · Gently wipe away any drainage from your eye with a warm, wet washcloth or a cotton ball.  · Apply a cool, clean washcloth to your eye for 10-20 minutes, 3-4 times a day.  General instructions   · Do not wear contact lenses until the inflammation is gone and your health care provider says it is safe to wear them again. Ask your health care provider how to sterilize or replace your contact lenses before you use them again. Wear glasses until you can resume wearing contacts.  · Avoid wearing eye makeup until the inflammation is gone. Throw away any old eye cosmetics that may be contaminated.  · Change or wash your pillowcase every day.  · Do not share towels or washcloths. This may spread the infection.  · Wash your hands often with soap and water. Use paper towels to dry your hands.  · Avoid touching or rubbing your eyes.  · Do not drive or use heavy machinery if your vision is blurred.  Contact a health care provider if:  · You have a fever.  · Your symptoms do not get better after 10 days.  Get help right away if:  · You have a fever and your symptoms suddenly get worse.  · You have severe pain when you move your eye.  · You have facial pain, redness, or swelling.  · You have sudden loss of vision.  This  information is not intended to replace advice given to you by your health care provider. Make sure you discuss any questions you have with your health care provider.  Document Released: 12/18/2006 Document Revised: 04/27/2017 Document Reviewed: 09/29/2016  [employer] Interactive Patient Education © 2017 [employer]

## 2018-03-20 NOTE — PROGRESS NOTES
Subjective   Maria Murray is a 38 y.o. female.     Had pink eye one month ago, used cipro drops, infection returned, used more cipro drops and has not had symptoms for two weeks.      Conjunctivitis    The current episode started yesterday. The onset was sudden. The problem has been gradually worsening. Associated symptoms include eye itching, congestion, rhinorrhea, eye discharge and eye redness. Pertinent negatives include no fever, no decreased vision, no double vision, no abdominal pain, no diarrhea, no nausea, no vomiting, no ear pain, no headaches, no sore throat, no swollen glands, no muscle aches, no cough, no rash and no eye pain. The left eye is affected. The eyelid exhibits no abnormality. There were no sick contacts. She has received no recent medical care.        Current Outpatient Prescriptions on File Prior to Visit   Medication Sig Dispense Refill   • fexofenadine (ALLEGRA) 180 MG tablet TAKE 1 TABLET EVERY DAY  5   • fluticasone (FLONASE) 50 MCG/ACT nasal spray 2 sprays into each nostril Daily.     • lamoTRIgine (LaMICtal) 100 MG tablet Take 100 mg by mouth 2 (Two) Times a Day.     • SYNTHROID 50 MCG tablet Take 1 tablet by mouth Daily. 30 tablet 5   • venlafaxine (EFFEXOR) 37.5 MG tablet Take 37.5 mg by mouth 2 (Two) Times a Day.     • [DISCONTINUED] ciprofloxacin (CILOXAN) 0.3 % ophthalmic solution Administer 1 drop to both eyes 4 (Four) Times a Day. 10 mL 0     Current Facility-Administered Medications on File Prior to Visit   Medication Dose Route Frequency Provider Last Rate Last Dose   • acetaminophen (TYLENOL) tablet 1,000 mg  1,000 mg Oral Once AKILAH Moran           Allergies   Allergen Reactions   • Other GI Intolerance     REPORTS ALLERGY TO GLUTEN     • Penicillins Hives   • Zithromax [Azithromycin] Anxiety       Past Medical History:   Diagnosis Date   • Ankle sprain     LEFT AND RIGHT   • Anxiety    • Arthritis    • Bursitis of hip     RIGHT   • Celiac disease  "   • Chronic pain disorder    • Dislocation, hip     RIGHT   • Fracture     LEFT FOOT IN THE PAST   • Fracture, patella     LEFT   • GERD (gastroesophageal reflux disease)    • H/O exercise stress test     STATES \"IT WAS OK\"   • H/O seasonal allergies    • Heart attack     STATES HAD MI AFTER CHILDBIRTH-STATES DUE TO HYPOVOLEMIA   • History of transfusion     REPORTS DID NOT HAVE A REACTION TO TRANSFUSION   • Migraine    • Obstructive sleep apnea    • Palpitations    • PCOS (polycystic ovarian syndrome)    • Sore throat    • Strep throat    • Subluxation of patella    • Tear of meniscus of knee     LEFT   • Thyroid disease    • Urinary symptom or sign    • Wears glasses        Past Surgical History:   Procedure Laterality Date   •  SECTION     • D&C HYSTEROSCOPY ENDOMETRIAL ABLATION N/A 2017    Procedure: DILATATION AND CURETTAGE HYSTEROSCOPY NOVASURE ENDOMETRIAL ABLATION;  Surgeon: Donna Fuentes MD;  Location: [facility] OR;  Service:    • DILATATION AND CURETTAGE      PATIENT REPORTS X2   • ENDOSCOPY     • KNEE ARTHROSCOPY Right 3/31/2017    Procedure: RIGHT KNEE DIAGNOSTIC ARTHROSCOPY WITH PARTIAL LATERAL MENISECTOMY, CHONDRAL SHAVING PATELLA;  Surgeon: Dejan Rodas MD;  Location: [facility] OR;  Service:    • KNEE SURGERY Left      Left ATS   • WISDOM TOOTH EXTRACTION         Family History   Problem Relation Age of Onset   • Cancer Mother      breast   • Bone cancer Father    • Cancer Maternal Aunt      breast   • Diabetes Maternal Uncle    • No Known Problems Brother    • Diabetes Maternal Grandfather    • Ovarian cancer Paternal Aunt        Social History     Social History   • Marital status:      Spouse name: N/A   • Number of children: N/A   • Years of education: N/A     Occupational History   • Not on file.     Social History Main Topics   • Smoking status: Former Smoker     Packs/day: 0.25     Years: 10.00     Types: Cigarettes     Quit date:    • Smokeless tobacco: Never Used    " "  Comment: QUIT in 2011, REPORTS WAS ONLY SMOKING A PACK PER WEEK   • Alcohol use No   • Drug use: No   • Sexual activity: Yes     Partners: Male     Birth control/ protection: Surgical      Comment:  vasectomy     Other Topics Concern   • Not on file     Social History Narrative   • No narrative on file       Review of Systems   Constitutional: Negative for activity change, appetite change, chills, diaphoresis and fever.   HENT: Positive for congestion and rhinorrhea. Negative for ear pain and sore throat.    Eyes: Positive for discharge, redness and itching. Negative for double vision and pain.   Respiratory: Negative for cough.    Cardiovascular: Negative for chest pain.   Gastrointestinal: Negative for abdominal pain, diarrhea, nausea and vomiting.   Skin: Negative for rash.   Neurological: Negative for headaches.       Pulse 83   Temp 97.9 °F (36.6 °C)   Resp 16   Ht 177.8 cm (70\")   Wt 128 kg (282 lb)   SpO2 98%   BMI 40.46 kg/m²     Objective   Physical Exam   Constitutional: She is oriented to person, place, and time. She appears well-developed and well-nourished. She is cooperative.   HENT:   Head: Normocephalic.   Right Ear: Tympanic membrane, external ear and ear canal normal.   Left Ear: Tympanic membrane, external ear and ear canal normal.   Nose: Right sinus exhibits no maxillary sinus tenderness and no frontal sinus tenderness. Left sinus exhibits no maxillary sinus tenderness and no frontal sinus tenderness.   Mouth/Throat: Uvula is midline, oropharynx is clear and moist and mucous membranes are normal. No tonsillar exudate.   Eyes: EOM and lids are normal. Pupils are equal, round, and reactive to light. Left eye exhibits discharge. Left conjunctiva is injected.   Cardiovascular: Normal rate, regular rhythm and normal heart sounds.    Pulmonary/Chest: Effort normal and breath sounds normal. No accessory muscle usage. No respiratory distress.   Lymphadenopathy:     She has no cervical " adenopathy.   Neurological: She is alert and oriented to person, place, and time.   Skin: Skin is warm, dry and intact.   Psychiatric: She has a normal mood and affect. Her speech is normal and behavior is normal.         Assessment/Plan   Maria was seen today for conjunctivitis.    Diagnoses and all orders for this visit:    Pink eye disease of left eye  -     neomycin-polymyxin-dexamethasone (MAXITROL) 0.1 % ophthalmic suspension; Administer 1 drop into the left eye 4 (Four) Times a Day for 2 days.        Results for orders placed or performed in visit on 02/14/18   POC Rapid Strep A   Result Value Ref Range    Rapid Strep A Screen Negative Negative, VALID, INVALID, Not Performed    Internal Control Passed Passed    Lot Number [id number]     Expiration Date 5/19        Follow up with PCP or go to the nearest emergency room if symptoms worsen or fail to improve.

## 2018-05-02 ENCOUNTER — OFFICE VISIT (OUTPATIENT)
Dept: INTERNAL MEDICINE | Facility: CLINIC | Age: 38
End: 2018-05-02

## 2018-05-02 ENCOUNTER — HOSPITAL ENCOUNTER (OUTPATIENT)
Dept: GENERAL RADIOLOGY | Facility: HOSPITAL | Age: 38
Discharge: HOME | End: 2018-05-02
Attending: INTERNAL MEDICINE | Admitting: INTERNAL MEDICINE

## 2018-05-02 VITALS
TEMPERATURE: 98.4 F | DIASTOLIC BLOOD PRESSURE: 72 MMHG | HEIGHT: 70 IN | BODY MASS INDEX: 41.09 KG/M2 | OXYGEN SATURATION: 98 % | WEIGHT: 287 LBS | HEART RATE: 71 BPM | SYSTOLIC BLOOD PRESSURE: 128 MMHG

## 2018-05-02 DIAGNOSIS — Z01.818 PREOP EXAMINATION: ICD-10-CM

## 2018-05-02 DIAGNOSIS — Z01.818 PREOP EXAMINATION: Primary | ICD-10-CM

## 2018-05-02 PROBLEM — N75.0 INFECTED CYST OF BARTHOLIN'S GLAND DUCT: Status: RESOLVED | Noted: 2017-06-01 | Resolved: 2018-05-02

## 2018-05-02 PROBLEM — J02.0 STREP PHARYNGITIS: Status: RESOLVED | Noted: 2017-06-01 | Resolved: 2018-05-02

## 2018-05-02 LAB
APTT PPP: 28.3 SECONDS (ref 25–36)
BASOPHILS # BLD AUTO: 0.02 10*3/MM3 (ref 0–0.2)
BASOPHILS NFR BLD AUTO: 0.3 % (ref 0–2.5)
BUN SERPL-MCNC: 15 MG/DL (ref 7–20)
BUN/CREAT SERPL: 25 (ref 7.1–23.5)
CALCIUM SERPL-MCNC: 9.3 MG/DL (ref 8.4–10.2)
CHLORIDE SERPL-SCNC: 103 MMOL/L (ref 98–107)
CO2 SERPL-SCNC: 27 MMOL/L (ref 26–30)
CREAT SERPL-MCNC: 0.6 MG/DL (ref 0.6–1.3)
EOSINOPHIL # BLD AUTO: 0 10*3/MM3 (ref 0–0.7)
EOSINOPHIL NFR BLD AUTO: 0 % (ref 0–7)
ERYTHROCYTE [DISTWIDTH] IN BLOOD BY AUTOMATED COUNT: 13 % (ref 11.5–14.5)
GFR SERPLBLD CREATININE-BSD FMLA CKD-EPI: 111 ML/MIN/1.73
GFR SERPLBLD CREATININE-BSD FMLA CKD-EPI: 135 ML/MIN/1.73
GLUCOSE SERPL-MCNC: 94 MG/DL (ref 74–98)
HCT VFR BLD AUTO: 39 % (ref 37–47)
HGB BLD-MCNC: 12.2 G/DL (ref 12–16)
IMM GRANULOCYTES # BLD: 0.02 10*3/MM3 (ref 0–0.06)
IMM GRANULOCYTES NFR BLD: 0.3 % (ref 0–0.6)
INR PPP: 1.19 (ref 0.9–1.1)
LYMPHOCYTES # BLD AUTO: 1.46 10*3/MM3 (ref 0.6–3.4)
LYMPHOCYTES NFR BLD AUTO: 23.5 % (ref 10–50)
MCH RBC QN AUTO: 27.2 PG (ref 27–31)
MCHC RBC AUTO-ENTMCNC: 31.3 G/DL (ref 30–37)
MCV RBC AUTO: 86.9 FL (ref 81–99)
MONOCYTES # BLD AUTO: 0.46 10*3/MM3 (ref 0–0.9)
MONOCYTES NFR BLD AUTO: 7.4 % (ref 0–12)
NEUTROPHILS # BLD AUTO: 4.26 10*3/MM3 (ref 2–6.9)
NEUTROPHILS NFR BLD AUTO: 68.5 % (ref 37–80)
NRBC BLD AUTO-RTO: 0 /100 WBC (ref 0–0)
PLATELET # BLD AUTO: 264 10*3/MM3 (ref 130–400)
POTASSIUM SERPL-SCNC: 4.6 MMOL/L (ref 3.5–5.1)
PROTHROMBIN TIME: 13.3 SECONDS (ref 9.3–12.1)
RBC # BLD AUTO: 4.49 10*6/MM3 (ref 4.2–5.4)
SODIUM SERPL-SCNC: 140 MMOL/L (ref 137–145)
WBC # BLD AUTO: 6.22 10*3/MM3 (ref 4.8–10.8)

## 2018-05-02 PROCEDURE — 93000 ELECTROCARDIOGRAM COMPLETE: CPT | Performed by: INTERNAL MEDICINE

## 2018-05-02 PROCEDURE — 99214 OFFICE O/P EST MOD 30 MIN: CPT | Performed by: INTERNAL MEDICINE

## 2018-05-02 PROCEDURE — 71046 X-RAY EXAM CHEST 2 VIEWS: CPT

## 2018-05-02 RX ORDER — THYROID,PORK 48.75 MG
48.75 TABLET ORAL DAILY
Refills: 2 | COMMUNITY
Start: 2018-03-20

## 2018-05-02 NOTE — PROGRESS NOTES
"Subjective   Maria Murray is a 38 y.o. female who presents to the office today for a preoperative consultation at the request of surgeon Dr Garcia who plans on performing tonsillectomy on May 16. This consultation is requested for the specific conditions prompting preoperative evaluation and clearance for surgery. Planned anesthesia: general. The patient has the following known anesthesia issues: no personal problems with anesthesia. Patients bleeding risk: she had postpartum bleeding, but has seen hematologists and her workup has been completely normal. Patient does not have objections to receiving blood products if needed.    The following portions of the patient's history were reviewed and updated as appropriate: allergies, current medications, past family history, past medical history, past social history, past surgical history and problem list.    Review of Systems  Ears, nose, mouth, throat, and face: positive for nasal congestion    Objective   /72   Pulse 71   Temp 98.4 °F (36.9 °C)   Ht 177.8 cm (70\")   Wt 130 kg (287 lb)   SpO2 98%   BMI 41.18 kg/m²   General appearance: alert, appears stated age and cooperative  Head: Normocephalic, without obvious abnormality, atraumatic  Eyes: conjunctivae/corneas clear. PERRL, EOM's intact. Fundi benign.  Ears: normal TM's and external ear canals both ears  Throat: lips, mucosa, and tongue normal; teeth and gums normal  Neck: no adenopathy, no carotid bruit, supple, symmetrical, trachea midline and thyroid not enlarged, symmetric, no tenderness/mass/nodules  Lungs: clear to auscultation bilaterally  Heart: regular rate and rhythm, S1, S2 normal, no murmur, click, rub or gallop  Abdomen: soft, non-tender; bowel sounds normal; no masses,  no organomegaly  Extremities: extremities normal, atraumatic, no cyanosis or edema  Pulses: 2+ and symmetric  Lymph nodes: Cervical, supraclavicular, and axillary nodes normal.  Neurologic: Grossly " normal    Predictors of intubation difficulty:  None, she has been intubated in past without difficutly    Cardiographics    ECG 12 Lead  Date/Time: 5/2/2018 12:35 PM  Performed by: VERMEESCH, MARILYN K  Authorized by: VERMEESCH, MARILYN K   Comparison: not compared with previous ECG   Rhythm: sinus rhythm  Rate: normal  BPM: 65  Conduction: conduction normal  ST Segments: ST segments normal  T Waves: T waves normal  QRS axis: normal  Other: no other findings  Clinical impression: normal ECG              Imaging  See attached    Lab Review   See attached labs    Assessment/Plan   38 y.o. female with planned surgery as above.    Known risk factors for perioperative complications: None    Difficulty with intubation is not anticipated.    Cardiac Risk Estimation: Low    Current medications which may produce withdrawal symptoms if withheld perioperatively: effexor, lamictal    1. Preoperative workup as follows: labs, chest x-ray and EKG per your request  2. Change in medication regimen before surgery: None  3. Prophylaxis for cardiac events with perioperative beta-blockers: not recommended  4. Invasive hemodynamic monitoring perioperatively: per anesthesia  5. Deep vein thrombosis prophylaxis postoperatively:regimen to be chosen by surgical team.  6. Surveillance for postoperative MI with ECG immediately postoperatively and on postoperative days 1 and 2 AND troponin levels 24 hours postoperatively and on day 4 or hospital discharge (whichever comes first): if pt has CP, recommend EKG and cardiac enzymes

## 2018-05-03 NOTE — PROGRESS NOTES
All labs are in normal range except slightly elevated PT and INR as noted.  Patient has seen Dr. Underwood in the past for abnormal bleeding postpartum.at that time her PT and INR were in normal limits.  Could you please send a copy of her PT PTT and INR, and call his NP and ask her to review this.  Please let NP know she is having tonsils out on May 16 and I would like to know if she is concerned about slight elevation of PT and INR or if she feels pt will be stable for surgery. Please attach copy of these labs to her preop H&P.  I will make note regarding PT and INR prior to sending H&P over for surgery.

## 2019-05-10 ENCOUNTER — OFFICE VISIT (OUTPATIENT)
Dept: RETAIL CLINIC | Facility: CLINIC | Age: 39
End: 2019-05-10

## 2019-05-10 VITALS
SYSTOLIC BLOOD PRESSURE: 118 MMHG | BODY MASS INDEX: 43.33 KG/M2 | WEIGHT: 293 LBS | DIASTOLIC BLOOD PRESSURE: 72 MMHG | RESPIRATION RATE: 18 BRPM | HEART RATE: 85 BPM | OXYGEN SATURATION: 98 % | TEMPERATURE: 97.7 F

## 2019-05-10 DIAGNOSIS — J02.0 STREP PHARYNGITIS: Primary | ICD-10-CM

## 2019-05-10 LAB
EXPIRATION DATE: ABNORMAL
INTERNAL CONTROL: ABNORMAL
Lab: ABNORMAL
S PYO AG THROAT QL: POSITIVE

## 2019-05-10 PROCEDURE — 99213 OFFICE O/P EST LOW 20 MIN: CPT | Performed by: NURSE PRACTITIONER

## 2019-05-10 PROCEDURE — 87880 STREP A ASSAY W/OPTIC: CPT | Performed by: NURSE PRACTITIONER

## 2019-05-10 RX ORDER — CEFDINIR 300 MG/1
300 CAPSULE ORAL 2 TIMES DAILY
Qty: 20 CAPSULE | Refills: 0 | Status: SHIPPED | OUTPATIENT
Start: 2019-05-10 | End: 2019-05-20

## 2019-05-10 RX ORDER — LAMOTRIGINE 150 MG/1
TABLET ORAL
Refills: 5 | COMMUNITY
Start: 2019-04-23

## 2019-05-10 NOTE — PROGRESS NOTES
Sore Throat      Subjective   Maria Murray is a 39 y.o. female.     Sore Throat    This is a new problem. The current episode started today. The problem has been gradually worsening. There has been no fever. Associated symptoms include ear pain (left ear ). Pertinent negatives include no abdominal pain, congestion, coughing, diarrhea, ear discharge, headaches, shortness of breath or vomiting. She has had exposure to strep (friend with strep ). She has had no exposure to mono. She has tried nothing for the symptoms. The treatment provided no relief.    Has not had influenza vaccine.  See ROS.      Patient Active Problem List   Diagnosis   • Hypothyroidism   • Empty sella syndrome (CMS/HCC)   • Arthritis   • Depression   • GERD without esophagitis   • Polycystic ovarian syndrome   • Celiac disease   • Environmental allergies   • Preop examination       Allergies   Allergen Reactions   • Other GI Intolerance     REPORTS ALLERGY TO GLUTEN     • Penicillins Hives   • Zithromax [Azithromycin] Anxiety        Current Outpatient Medications on File Prior to Visit   Medication Sig Dispense Refill   • B COMPLEX VITAMINS PO Take  by mouth.     • lamoTRIgine (LaMICtal) 150 MG tablet TAKE ONE-HALF TABLET (75MG) BY MOUTH TWICE DAILY  5   • THYROIDTHROID 48.75 MG tablet tablet Take 48.75 mg by mouth Daily.  2   • venlafaxine (EFFEXOR) 37.5 MG tablet Take 37.5 mg by mouth 2 (Two) Times a Day.     • [DISCONTINUED] lamoTRIgine (LaMICtal) 100 MG tablet Take 100 mg by mouth 2 (Two) Times a Day.     • [DISCONTINUED] fluticasone (FLONASE) 50 MCG/ACT nasal spray 2 sprays into each nostril Daily.       Current Facility-Administered Medications on File Prior to Visit   Medication Dose Route Frequency Provider Last Rate Last Dose   • [DISCONTINUED] acetaminophen (TYLENOL) tablet 1,000 mg  1,000 mg Oral Once Amita Dunlap APRN           Past Medical History:   Diagnosis Date   • Ankle sprain     LEFT AND RIGHT   •  "Anxiety    • Arthritis    • Bursitis of hip     RIGHT   • Celiac disease    • Chronic pain disorder    • Dislocation, hip (CMS/HCC)     RIGHT   • Fracture     LEFT FOOT IN THE PAST   • Fracture, patella     LEFT   • GERD (gastroesophageal reflux disease)    • H/O exercise stress test     STATES \"IT WAS OK\"   • H/O seasonal allergies    • Heart attack (CMS/HCC) 2011    STATES HAD MI AFTER CHILDBIRTH-STATES DUE TO HYPOVOLEMIA   • History of transfusion     REPORTS DID NOT HAVE A REACTION TO TRANSFUSION   • Migraine    • Obstructive sleep apnea    • Palpitations    • PCOS (polycystic ovarian syndrome)    • Sore throat    • Strep throat    • Subluxation of patella    • Tear of meniscus of knee     LEFT   • Thyroid disease    • Urinary symptom or sign    • Wears glasses        Past Surgical History:   Procedure Laterality Date   •  SECTION     • D&C HYSTEROSCOPY ENDOMETRIAL ABLATION N/A 2017    Procedure: DILATATION AND CURETTAGE HYSTEROSCOPY NOVASURE ENDOMETRIAL ABLATION;  Surgeon: Donna Fuentes MD;  Location: [facility] OR;  Service:    • DILATATION AND CURETTAGE      PATIENT REPORTS X2   • ENDOMETRIAL ABLATION     • ENDOSCOPY     • KNEE ARTHROSCOPY Right 3/31/2017    Procedure: RIGHT KNEE DIAGNOSTIC ARTHROSCOPY WITH PARTIAL LATERAL MENISECTOMY, CHONDRAL SHAVING PATELLA;  Surgeon: Dejan Rodas MD;  Location: [facility] OR;  Service:    • KNEE SURGERY Left      Left ATS   • WISDOM TOOTH EXTRACTION         Family History   Problem Relation Age of Onset   • Cancer Mother         breast   • Bone cancer Father    • Cancer Maternal Aunt         breast   • Diabetes Maternal Uncle    • No Known Problems Brother    • Diabetes Maternal Grandfather    • Ovarian cancer Paternal Aunt        Social History     Socioeconomic History   • Marital status:      Spouse name: Not on file   • Number of children: Not on file   • Years of education: Not on file   • Highest education level: Not on file   Tobacco Use   • " Smoking status: Former Smoker     Packs/day: 0.25     Years: 10.00     Pack years: 2.50     Types: Cigarettes     Last attempt to quit: 2011     Years since quittin.3   • Smokeless tobacco: Never Used   • Tobacco comment: QUIT in , REPORTS WAS ONLY SMOKING A PACK PER WEEK   Substance and Sexual Activity   • Alcohol use: No   • Drug use: No   • Sexual activity: Yes     Partners: Male     Birth control/protection: Surgical     Comment:  vasectomy       Travel:  No recent travel within the last 21 days outside the [place] Denies recent travel to one of the following [place]  Countries:  [place], [place], [place], or [place].  Denies contact with anyone who has traveled to one of the following [place]  Countries: [place], [place], [place], or [place] within the last 21 days and is known or suspected to have Ebola.  Denies having had any contact with the human remains, blood or any bodily fluids of someone who is known or suspected to have Ebola within the last 21 days.     OB History      Para Term  AB Living    6 5 5   1 5    SAB TAB Ectopic Molar Multiple Live Births    1                    Review of Systems   Constitutional: Positive for chills. Negative for diaphoresis and fever.   HENT: Positive for ear pain (left ear ), postnasal drip (at baseline ), sore throat and voice change (mild ). Negative for congestion, dental problem, ear discharge, mouth sores, nosebleeds, rhinorrhea, sinus pressure, sinus pain and sneezing.    Respiratory: Negative for cough, shortness of breath and wheezing.    Gastrointestinal: Negative for abdominal pain, diarrhea, nausea and vomiting.   Musculoskeletal: Positive for myalgias.   Skin: Negative for rash.   Neurological: Negative for dizziness and headaches.       /72 (BP Location: Right arm, Patient Position: Sitting, Cuff Size: Large Adult)   Pulse 85   Temp 97.7 °F (36.5 °C) (Temporal)   Resp 18   Wt (!) 137 kg (302 lb)   LMP  06/01/2017   SpO2 98%   Breastfeeding? Yes   BMI 43.33 kg/m²     Objective   Physical Exam   Constitutional: She is oriented to person, place, and time. She appears well-developed and well-nourished. She is cooperative. She appears ill (mild ). No distress.   HENT:   Head: Normocephalic.   Right Ear: Tympanic membrane, external ear and ear canal normal.   Left Ear: Tympanic membrane, external ear and ear canal normal.   Nose: Nose normal. No mucosal edema or rhinorrhea. Right sinus exhibits no maxillary sinus tenderness and no frontal sinus tenderness. Left sinus exhibits no maxillary sinus tenderness and no frontal sinus tenderness.   Mouth/Throat: Uvula is midline. Mucous membranes are dry. Posterior oropharyngeal erythema present. Tonsils are 1+ on the right. Tonsils are 1+ on the left. Tonsillar exudate (trace left tonsil ).   Cardiovascular: Normal rate, regular rhythm and normal heart sounds.   Pulmonary/Chest: Effort normal and breath sounds normal. No stridor. She has no decreased breath sounds. She has no wheezes. She has no rhonchi. She has no rales.   Lymphadenopathy:        Head (right side): Tonsillar adenopathy present. No preauricular, no posterior auricular and no occipital adenopathy present.        Head (left side): Tonsillar adenopathy present. No preauricular, no posterior auricular and no occipital adenopathy present.     She has cervical adenopathy.   Neurological: She is alert and oriented to person, place, and time.   Skin: Skin is warm, dry and intact. No rash noted.       Assessment/Plan   Maria was seen today for sore throat.    Diagnoses and all orders for this visit:    Strep pharyngitis  -     POC Rapid Strep A  -     cefdinir (OMNICEF) 300 MG capsule; Take 1 capsule by mouth 2 (Two) Times a Day for 10 days.        Results for orders placed or performed in visit on 05/10/19   POC Rapid Strep A   Result Value Ref Range    Rapid Strep A Screen Positive (A) Negative, VALID, INVALID,  Not Performed    Internal Control Passed Passed    Lot Number [id number]     Expiration Date 10/2,020        Return if symptoms worsen or fail to improve with PCP .

## 2020-07-20 ENCOUNTER — OFFICE VISIT (OUTPATIENT)
Dept: OBSTETRICS AND GYNECOLOGY | Facility: CLINIC | Age: 40
End: 2020-07-20

## 2020-07-20 VITALS
BODY MASS INDEX: 41.95 KG/M2 | HEIGHT: 70 IN | DIASTOLIC BLOOD PRESSURE: 78 MMHG | WEIGHT: 293 LBS | SYSTOLIC BLOOD PRESSURE: 130 MMHG

## 2020-07-20 DIAGNOSIS — Z12.4 SCREENING FOR CERVICAL CANCER: ICD-10-CM

## 2020-07-20 DIAGNOSIS — Z12.39 SCREENING FOR BREAST CANCER: ICD-10-CM

## 2020-07-20 DIAGNOSIS — Z01.419 ENCOUNTER FOR GYNECOLOGICAL EXAMINATION WITHOUT ABNORMAL FINDING: Primary | ICD-10-CM

## 2020-07-20 PROCEDURE — 99396 PREV VISIT EST AGE 40-64: CPT | Performed by: PHYSICIAN ASSISTANT

## 2020-07-20 NOTE — PROGRESS NOTES
"Subjective   Chief Complaint   Patient presents with   • Gynecologic Exam     Last pap done in , MMG is due, No complaints       Maria Murray is a 40 y.o. year old  presenting to be seen for her annual gynecological exam.   She has no complaints or concerns  Has had endometrial ablation 2017with no bleeds post ablation  Screening mammogram is due. Her mother and maternal aunt had breast cancer    Past Medical History:   Diagnosis Date   • Ankle sprain     LEFT AND RIGHT   • Anxiety    • Arthritis    • Bursitis of hip     RIGHT   • Celiac disease    • Chronic pain disorder    • Dislocation, hip (CMS/HCC)     RIGHT   • Fracture     LEFT FOOT IN THE PAST   • Fracture, patella     LEFT   • GERD (gastroesophageal reflux disease)    • H/O exercise stress test     STATES \"IT WAS OK\"   • H/O seasonal allergies    • Heart attack (CMS/HCC)     STATES HAD MI AFTER CHILDBIRTH-STATES DUE TO HYPOVOLEMIA   • History of transfusion     REPORTS DID NOT HAVE A REACTION TO TRANSFUSION   • Migraine    • Obstructive sleep apnea    • Palpitations    • PCOS (polycystic ovarian syndrome)    • Sore throat    • Strep throat    • Subluxation of patella    • Tear of meniscus of knee     LEFT   • Thyroid disease    • Urinary symptom or sign    • Wears glasses         Current Outpatient Medications:   •  B COMPLEX VITAMINS PO, Take  by mouth., Disp: , Rfl:   •  lamoTRIgine (LaMICtal) 150 MG tablet, TAKE ONE-HALF TABLET (75MG) BY MOUTH TWICE DAILY, Disp: , Rfl: 5  •  THYROIDTHROID 48.75 MG tablet tablet, Take 48.75 mg by mouth Daily., Disp: , Rfl: 2  •  venlafaxine (EFFEXOR) 37.5 MG tablet, Take 37.5 mg by mouth 2 (Two) Times a Day., Disp: , Rfl:    Allergies   Allergen Reactions   • Other GI Intolerance     REPORTS ALLERGY TO GLUTEN     • Penicillins Hives   • Zithromax [Azithromycin] Anxiety      Past Surgical History:   Procedure Laterality Date   •  SECTION     • D&C HYSTEROSCOPY ENDOMETRIAL ABLATION N/A " "2017    Procedure: DILATATION AND CURETTAGE HYSTEROSCOPY NOVASURE ENDOMETRIAL ABLATION;  Surgeon: Donna Fuentes MD;  Location: [facility] [place];  Service:    • DILATATION AND CURETTAGE      PATIENT REPORTS X2   • ENDOMETRIAL ABLATION     • ENDOSCOPY     • KNEE ARTHROSCOPY Right 3/31/2017    Procedure: RIGHT KNEE DIAGNOSTIC ARTHROSCOPY WITH PARTIAL LATERAL MENISECTOMY, CHONDRAL SHAVING PATELLA;  Surgeon: Dejan Rodas MD;  Location: [facility] OR;  Service:    • KNEE SURGERY Left      Left ATS   • WISDOM TOOTH EXTRACTION        Social History     Socioeconomic History   • Marital status:      Spouse name: Not on file   • Number of children: Not on file   • Years of education: Not on file   • Highest education level: Not on file   Tobacco Use   • Smoking status: Former Smoker     Packs/day: 0.25     Years: 10.00     Pack years: 2.50     Types: Cigarettes     Last attempt to quit:      Years since quittin.5   • Smokeless tobacco: Never Used   • Tobacco comment: QUIT in , REPORTS WAS ONLY SMOKING A PACK PER WEEK   Substance and Sexual Activity   • Alcohol use: No   • Drug use: No   • Sexual activity: Yes     Partners: Male     Birth control/protection: Surgical     Comment:  vasectomy      Family History   Problem Relation Age of Onset   • Cancer Mother         breast   • Bone cancer Father    • Cancer Maternal Aunt         breast   • Diabetes Maternal Uncle    • No Known Problems Brother    • Diabetes Maternal Grandfather    • Ovarian cancer Paternal Aunt        Review of Systems   Constitutional: Negative for chills, diaphoresis, fatigue and fever.   Gastrointestinal: Negative for abdominal pain, nausea and vomiting.   Genitourinary: Negative for difficulty urinating, dysuria, menstrual problem, pelvic pain and vaginal discharge.   Musculoskeletal: Negative.            Objective   /78   Ht 177.8 cm (70\")   Wt (!) 140 kg (309 lb 3.2 oz)   Breastfeeding No   BMI 44.37 kg/m²     Physical " Exam   Constitutional: She appears well-developed and well-nourished. She is cooperative. No distress.   Eyes: Conjunctivae, EOM and lids are normal.   Pulmonary/Chest: Right breast exhibits no inverted nipple, no mass, no nipple discharge, no skin change and no tenderness. Left breast exhibits no inverted nipple, no mass, no nipple discharge, no skin change and no tenderness.   Abdominal: Soft. Normal appearance. There is no tenderness.   Genitourinary: Uterus normal. There is no tenderness or lesion on the right labia. There is no tenderness or lesion on the left labia. Cervix exhibits no motion tenderness, no discharge and no friability. Right adnexum displays no mass and no tenderness. Left adnexum displays no mass and no tenderness. No erythema or tenderness in the vagina. No vaginal discharge found.   Genitourinary Comments: Pap done   Neurological: She is alert.   Skin: Skin is warm and dry. No lesion and no rash noted.   Psychiatric: She has a normal mood and affect. Her behavior is normal. Thought content normal.            Assessment and Plan  Maria was seen today for gynecologic exam.    Diagnoses and all orders for this visit:    Encounter for gynecological examination without abnormal finding    Screening for cervical cancer  -     Liquid-based Pap Smear, Screening; Future    Screening for breast cancer  -     Mammo Screening Digital Tomosynthesis Bilateral With CAD; Future      Patient Instructions   Encourage self breast exam monthly  Screening mammogram now  Regular exercise             This note was electronically signed.    Pat Parker PA-C   July 20, 2020

## 2020-07-28 DIAGNOSIS — Z12.4 SCREENING FOR CERVICAL CANCER: ICD-10-CM

## 2021-04-06 ENCOUNTER — OFFICE VISIT (OUTPATIENT)
Dept: OBSTETRICS AND GYNECOLOGY | Facility: CLINIC | Age: 41
End: 2021-04-06

## 2021-04-06 VITALS
WEIGHT: 293 LBS | BODY MASS INDEX: 41.95 KG/M2 | DIASTOLIC BLOOD PRESSURE: 74 MMHG | HEIGHT: 70 IN | SYSTOLIC BLOOD PRESSURE: 114 MMHG

## 2021-04-06 DIAGNOSIS — N93.9 VAGINAL BLEEDING: Primary | ICD-10-CM

## 2021-04-06 DIAGNOSIS — Z98.890 STATUS POST ENDOMETRIAL ABLATION: ICD-10-CM

## 2021-04-06 PROCEDURE — 99212 OFFICE O/P EST SF 10 MIN: CPT | Performed by: PHYSICIAN ASSISTANT

## 2021-04-06 NOTE — PATIENT INSTRUCTIONS
Patient is reassured it is not uncommon to have eventual return of menses post ablation.  We will however have her return for pelvic ultrasound for better evaluation of uterus and endometrium.  If ultrasound results not concerning advise expectant management.

## 2021-04-06 NOTE — PROGRESS NOTES
"Subjective   Chief Complaint   Patient presents with   • Vaginal Bleeding     Patient had an ablation done 5 years ago.  Periods have gradually came back       Maria Murray is a 41 y.o. year old  presenting to be seen for vaginal bleeding.  Patient has had an endometrial ablation done about 5 years ago.  Reports that she was amenorrheic for about 2 years after the ablation but her bleeding has gradually returned.  Initially she had a very random occasional light bleed for the third and fourth year after the ablation however over the last several months she feels the bleeding has become a little more frequent but is still random and is not sure if it even occurs once a month.  The bleeding is light lasting 2 to 3 days only.  Last bleeding she saw was about 3 weeks ago.  She is not having any pelvic pain or cramping.  She had a normal Pap smear 2020.    Past Medical History:   Diagnosis Date   • Ankle sprain     LEFT AND RIGHT   • Anxiety    • Arthritis    • Bursitis of hip     RIGHT   • Celiac disease    • Chronic pain disorder    • Dislocation, hip (CMS/HCC)     RIGHT   • Fracture     LEFT FOOT IN THE PAST   • Fracture, patella     LEFT   • GERD (gastroesophageal reflux disease)    • H/O exercise stress test     STATES \"IT WAS OK\"   • H/O seasonal allergies    • Heart attack (CMS/HCC)     STATES HAD MI AFTER CHILDBIRTH-STATES DUE TO HYPOVOLEMIA   • History of transfusion     REPORTS DID NOT HAVE A REACTION TO TRANSFUSION   • Migraine    • Obstructive sleep apnea    • Palpitations    • PCOS (polycystic ovarian syndrome)    • Sore throat    • Strep throat    • Subluxation of patella    • Tear of meniscus of knee     LEFT   • Thyroid disease    • Urinary symptom or sign    • Wears glasses         Current Outpatient Medications:   •  lamoTRIgine (LaMICtal) 150 MG tablet, TAKE ONE-HALF TABLET (75MG) BY MOUTH TWICE DAILY, Disp: , Rfl: 5  •  THYROIDTHROID 48.75 MG tablet tablet, Take 48.75 mg by " mouth Daily., Disp: , Rfl: 2  •  venlafaxine (EFFEXOR) 37.5 MG tablet, Take 37.5 mg by mouth 2 (Two) Times a Day., Disp: , Rfl:   •  B COMPLEX VITAMINS PO, Take  by mouth., Disp: , Rfl:    Allergies   Allergen Reactions   • Other GI Intolerance     REPORTS ALLERGY TO GLUTEN     • Penicillins Hives   • Zithromax [Azithromycin] Anxiety      Past Surgical History:   Procedure Laterality Date   •  SECTION     • D & C HYSTEROSCOPY ENDOMETRIAL ABLATION N/A 2017    Procedure: DILATATION AND CURETTAGE HYSTEROSCOPY NOVASURE ENDOMETRIAL ABLATION;  Surgeon: Donna Fuentes MD;  Location: [facility];  Service:    • DILATATION AND CURETTAGE      PATIENT REPORTS X2   • ENDOMETRIAL ABLATION     • ENDOSCOPY     • KNEE ARTHROSCOPY Right 3/31/2017    Procedure: RIGHT KNEE DIAGNOSTIC ARTHROSCOPY WITH PARTIAL LATERAL MENISECTOMY, CHONDRAL SHAVING PATELLA;  Surgeon: Dejan Rodas MD;  Location: [facility] OR;  Service:    • KNEE SURGERY Left 2000     Left ATS   • WISDOM TOOTH EXTRACTION        Social History     Socioeconomic History   • Marital status:      Spouse name: Not on file   • Number of children: Not on file   • Years of education: Not on file   • Highest education level: Not on file   Tobacco Use   • Smoking status: Former Smoker     Packs/day: 0.25     Years: 10.00     Pack years: 2.50     Types: Cigarettes     Quit date:      Years since quitting: 10.2   • Smokeless tobacco: Never Used   • Tobacco comment: QUIT in , REPORTS WAS ONLY SMOKING A PACK PER WEEK   Vaping Use   • Vaping Use: Never used   Substance and Sexual Activity   • Alcohol use: No   • Drug use: No   • Sexual activity: Yes     Partners: Male     Birth control/protection: Surgical     Comment:  vasectomy      Family History   Problem Relation Age of Onset   • Cancer Mother         breast   • Bone cancer Father    • Cancer Maternal Aunt         breast   • Diabetes Maternal Uncle    • No Known Problems Brother    • Diabetes Maternal  "Grandfather    • Ovarian cancer Paternal Aunt        Review of Systems   Constitutional: Negative for chills, diaphoresis and fever.   Gastrointestinal: Negative for abdominal pain, constipation, diarrhea, nausea and vomiting.   Genitourinary: Negative for difficulty urinating, dysuria, menstrual problem and pelvic pain.           Objective   /74   Ht 177.8 cm (70\")   Wt 133 kg (293 lb)   Breastfeeding No   BMI 42.04 kg/m²     Physical Exam  Constitutional:       Appearance: Normal appearance. She is well-developed and well-groomed.   Eyes:      General: Lids are normal.      Extraocular Movements: Extraocular movements intact.      Conjunctiva/sclera: Conjunctivae normal.   Abdominal:      Palpations: Abdomen is soft.      Tenderness: There is no abdominal tenderness.   Genitourinary:     Labia:         Right: No rash, tenderness or lesion.         Left: No rash, tenderness or lesion.       Urethra: No prolapse, urethral pain, urethral swelling or urethral lesion.      Vagina: No vaginal discharge, tenderness or lesions.      Cervix: No cervical motion tenderness, discharge, friability or lesion.      Uterus: Not enlarged and not tender.       Adnexa:         Right: No mass or tenderness.          Left: No mass or tenderness.     Neurological:      General: No focal deficit present.      Mental Status: She is alert and oriented to person, place, and time.   Psychiatric:         Attention and Perception: Attention normal.         Mood and Affect: Mood normal.         Speech: Speech normal.         Behavior: Behavior is cooperative.            Result Review :                   Assessment and Plan  Diagnoses and all orders for this visit:    1. Vaginal bleeding (Primary)  -     US Non-ob Transvaginal    2. Status post endometrial ablation  -     US Non-ob Transvaginal      Patient Instructions   Patient is reassured it is not uncommon to have eventual return of menses post ablation.  We will however have her " return for pelvic ultrasound for better evaluation of uterus and endometrium.  If ultrasound results not concerning advise expectant management.             This note was electronically signed.    Pat Parker PA-C   April 6, 2021

## 2021-04-21 ENCOUNTER — OFFICE VISIT (OUTPATIENT)
Dept: OBSTETRICS AND GYNECOLOGY | Facility: CLINIC | Age: 41
End: 2021-04-21

## 2021-04-21 VITALS
WEIGHT: 293 LBS | DIASTOLIC BLOOD PRESSURE: 90 MMHG | BODY MASS INDEX: 41.95 KG/M2 | HEIGHT: 70 IN | SYSTOLIC BLOOD PRESSURE: 140 MMHG

## 2021-04-21 DIAGNOSIS — N93.9 VAGINAL BLEEDING: Primary | ICD-10-CM

## 2021-04-21 DIAGNOSIS — Z98.890 STATUS POST ENDOMETRIAL ABLATION: ICD-10-CM

## 2021-04-21 PROCEDURE — 99212 OFFICE O/P EST SF 10 MIN: CPT | Performed by: PHYSICIAN ASSISTANT

## 2021-04-21 NOTE — PATIENT INSTRUCTIONS
Patient is reassured her pelvic ultrasound is unremarkable.  As bleeding has been fairly light and random thus far we will observe for now.  Patient is advised to call or return to the office should she have any changes with bleeding becoming heavier longer or pain.  Keep follow-up appointment for annual in July.

## 2021-04-21 NOTE — PROGRESS NOTES
"Subjective   Chief Complaint   Patient presents with   • Follow-up     TVS, Vaginal bleeding off and on ( random)        Maria Murray is a 41 y.o. year old  presenting to be seen for follow-up vaginal bleeding.  Patient had been seen recently for bleeding issues.  She had endometrial ablation  which did stop her menses for about 2 years but her menses have gradually been returning.  When she does bleed it is usually pretty light and last only a few days.  She has not really kept track of the bleeding and does not think it has been every month.  Transvaginal pelvic ultrasound is done in the office today and reviewed with patient.  It notes a normal-appearing anteverted uterus, endometrium 9 mm.  Right ovary has a 2 cm simple cyst.  Left ovary appears normal.  There is no free fluid or adnexal masses.  Ultrasound images are reviewed by myself.      Past Medical History:   Diagnosis Date   • Ankle sprain     LEFT AND RIGHT   • Anxiety    • Arthritis    • Bursitis of hip     RIGHT   • Celiac disease    • Chronic pain disorder    • Dislocation, hip (CMS/HCC)     RIGHT   • Fracture     LEFT FOOT IN THE PAST   • Fracture, patella     LEFT   • GERD (gastroesophageal reflux disease)    • H/O exercise stress test     STATES \"IT WAS OK\"   • H/O seasonal allergies    • Heart attack (CMS/HCC)     STATES HAD MI AFTER CHILDBIRTH-STATES DUE TO HYPOVOLEMIA   • History of transfusion     REPORTS DID NOT HAVE A REACTION TO TRANSFUSION   • Migraine    • Obstructive sleep apnea    • Palpitations    • PCOS (polycystic ovarian syndrome)    • Sore throat    • Strep throat    • Subluxation of patella    • Tear of meniscus of knee     LEFT   • Thyroid disease    • Urinary symptom or sign    • Wears glasses         Current Outpatient Medications:   •  B COMPLEX VITAMINS PO, Take  by mouth., Disp: , Rfl:   •  lamoTRIgine (LaMICtal) 150 MG tablet, TAKE ONE-HALF TABLET (75MG) BY MOUTH TWICE DAILY, Disp: , Rfl: 5  •  " THYROIDTHROID 48.75 MG tablet tablet, Take 48.75 mg by mouth Daily., Disp: , Rfl: 2  •  venlafaxine (EFFEXOR) 37.5 MG tablet, Take 37.5 mg by mouth 2 (Two) Times a Day., Disp: , Rfl:    Allergies   Allergen Reactions   • Other GI Intolerance     REPORTS ALLERGY TO GLUTEN     • Penicillins Hives   • Zithromax [Azithromycin] Anxiety      Past Surgical History:   Procedure Laterality Date   •  SECTION     • D & C HYSTEROSCOPY ENDOMETRIAL ABLATION N/A 2017    Procedure: DILATATION AND CURETTAGE HYSTEROSCOPY NOVASURE ENDOMETRIAL ABLATION;  Surgeon: Donna Fuentes MD;  Location: [facility];  Service:    • DILATATION AND CURETTAGE      PATIENT REPORTS X2   • ENDOMETRIAL ABLATION     • ENDOSCOPY     • KNEE ARTHROSCOPY Right 3/31/2017    Procedure: RIGHT KNEE DIAGNOSTIC ARTHROSCOPY WITH PARTIAL LATERAL MENISECTOMY, CHONDRAL SHAVING PATELLA;  Surgeon: Dejan Rodas MD;  Location: [facility] OR;  Service:    • KNEE SURGERY Left 2000     Left ATS   • WISDOM TOOTH EXTRACTION        Social History     Socioeconomic History   • Marital status:      Spouse name: Not on file   • Number of children: Not on file   • Years of education: Not on file   • Highest education level: Not on file   Tobacco Use   • Smoking status: Former Smoker     Packs/day: 0.25     Years: 10.00     Pack years: 2.50     Types: Cigarettes     Quit date:      Years since quitting: 10.3   • Smokeless tobacco: Never Used   • Tobacco comment: QUIT in , REPORTS WAS ONLY SMOKING A PACK PER WEEK   Vaping Use   • Vaping Use: Never used   Substance and Sexual Activity   • Alcohol use: No   • Drug use: No   • Sexual activity: Yes     Partners: Male     Birth control/protection: Surgical     Comment:  vasectomy      Family History   Problem Relation Age of Onset   • Cancer Mother         breast   • Bone cancer Father    • Cancer Maternal Aunt         breast   • Diabetes Maternal Uncle    • No Known Problems Brother    • Diabetes Maternal  "Grandfather    • Ovarian cancer Paternal Aunt        Review of Systems   Constitutional: Negative.    Gastrointestinal: Negative.    Genitourinary: Positive for menstrual problem. Negative for pelvic pain.           Objective   /90   Ht 177.8 cm (70\")   Wt 135 kg (298 lb)   LMP  (LMP Unknown)   BMI 42.76 kg/m²     Physical Exam       Result Review :   The following data was reviewed by: Pat Parker PA-C on 04/21/2021:    Data reviewed: pelvic ultrasound            Assessment and Plan  Diagnoses and all orders for this visit:    1. Vaginal bleeding (Primary)    2. Status post endometrial ablation      Patient Instructions   Patient is reassured her pelvic ultrasound is unremarkable.  As bleeding has been fairly light and random thus far we will observe for now.  Patient is advised to call or return to the office should she have any changes with bleeding becoming heavier longer or pain.  Keep follow-up appointment for annual in July.             This note was electronically signed.    Pat Parker PA-C   April 21, 2021  "

## 2021-07-21 ENCOUNTER — OFFICE VISIT (OUTPATIENT)
Dept: OBSTETRICS AND GYNECOLOGY | Facility: CLINIC | Age: 41
End: 2021-07-21

## 2021-07-21 VITALS
BODY MASS INDEX: 41.95 KG/M2 | SYSTOLIC BLOOD PRESSURE: 122 MMHG | WEIGHT: 293 LBS | HEIGHT: 70 IN | DIASTOLIC BLOOD PRESSURE: 72 MMHG

## 2021-07-21 DIAGNOSIS — Z12.31 ENCOUNTER FOR SCREENING MAMMOGRAM FOR MALIGNANT NEOPLASM OF BREAST: ICD-10-CM

## 2021-07-21 DIAGNOSIS — Z12.4 SCREENING FOR CERVICAL CANCER: ICD-10-CM

## 2021-07-21 DIAGNOSIS — Z01.419 ENCOUNTER FOR GYNECOLOGICAL EXAMINATION WITHOUT ABNORMAL FINDING: Primary | ICD-10-CM

## 2021-07-21 PROCEDURE — 99396 PREV VISIT EST AGE 40-64: CPT | Performed by: PHYSICIAN ASSISTANT

## 2021-07-21 RX ORDER — FLUTICASONE PROPIONATE 50 MCG
SPRAY, SUSPENSION (ML) NASAL
COMMUNITY
Start: 2021-07-12

## 2021-07-21 RX ORDER — LEVOCETIRIZINE DIHYDROCHLORIDE 5 MG/1
5 TABLET, FILM COATED ORAL EVERY EVENING
COMMUNITY
Start: 2021-07-12

## 2021-07-21 NOTE — PROGRESS NOTES
"Subjective   Chief Complaint   Patient presents with   • Gynecologic Exam     Last pap done 20-WNL, MMG is due, No complaints       Maria Murray is a 41 y.o. year old  presenting to be seen for her annual gynecological exam.   She has no complaints or concerns.  Previous endometrial ablation and she has a very light occasional spotting.  She has never had a screening mammogram.  She has no family history of breast cancer.      Past Medical History:   Diagnosis Date   • Ankle sprain     LEFT AND RIGHT   • Anxiety    • Arthritis    • Bursitis of hip     RIGHT   • Celiac disease    • Chronic pain disorder    • Dislocation, hip (CMS/HCC)     RIGHT   • Fracture     LEFT FOOT IN THE PAST   • Fracture, patella     LEFT   • GERD (gastroesophageal reflux disease)    • H/O exercise stress test     STATES \"IT WAS OK\"   • H/O seasonal allergies    • Heart attack (CMS/HCC)     STATES HAD MI AFTER CHILDBIRTH-STATES DUE TO HYPOVOLEMIA   • History of transfusion     REPORTS DID NOT HAVE A REACTION TO TRANSFUSION   • Migraine    • Obstructive sleep apnea    • Palpitations    • PCOS (polycystic ovarian syndrome)    • Sore throat    • Strep throat    • Subluxation of patella    • Tear of meniscus of knee     LEFT   • Thyroid disease    • Urinary symptom or sign    • Wears glasses         Current Outpatient Medications:   •  B COMPLEX VITAMINS PO, Take  by mouth., Disp: , Rfl:   •  fluticasone (FLONASE) 50 MCG/ACT nasal spray, SPRAY 2 SPRAY BY INTRANASAL ROUTE EVERY DAY IN EACH NOSTRIL, Disp: , Rfl:   •  lamoTRIgine (LaMICtal) 150 MG tablet, TAKE ONE-HALF TABLET (75MG) BY MOUTH TWICE DAILY, Disp: , Rfl: 5  •  levocetirizine (XYZAL) 5 MG tablet, Take 5 mg by mouth Every Evening., Disp: , Rfl:   •  THYROIDTHROID 48.75 MG tablet tablet, Take 48.75 mg by mouth Daily., Disp: , Rfl: 2  •  venlafaxine (EFFEXOR) 37.5 MG tablet, Take 37.5 mg by mouth 2 (Two) Times a Day., Disp: , Rfl:    Allergies   Allergen Reactions "   • Other GI Intolerance     REPORTS ALLERGY TO GLUTEN     • Penicillins Hives   • Zithromax [Azithromycin] Anxiety      Past Surgical History:   Procedure Laterality Date   •  SECTION     • D & C HYSTEROSCOPY ENDOMETRIAL ABLATION N/A 2017    Procedure: DILATATION AND CURETTAGE HYSTEROSCOPY NOVASURE ENDOMETRIAL ABLATION;  Surgeon: Donna Fuentes MD;  Location: [facility];  Service:    • DILATATION AND CURETTAGE      PATIENT REPORTS X2   • ENDOMETRIAL ABLATION     • ENDOSCOPY     • KNEE ARTHROSCOPY Right 3/31/2017    Procedure: RIGHT KNEE DIAGNOSTIC ARTHROSCOPY WITH PARTIAL LATERAL MENISECTOMY, CHONDRAL SHAVING PATELLA;  Surgeon: Dejan Rodas MD;  Location: [facility] OR;  Service:    • KNEE SURGERY Left      Left ATS   • WISDOM TOOTH EXTRACTION        Social History     Socioeconomic History   • Marital status:      Spouse name: Not on file   • Number of children: Not on file   • Years of education: Not on file   • Highest education level: Not on file   Tobacco Use   • Smoking status: Former Smoker     Packs/day: 0.25     Years: 10.00     Pack years: 2.50     Types: Cigarettes     Quit date:      Years since quitting: 10.5   • Smokeless tobacco: Never Used   • Tobacco comment: QUIT in , REPORTS WAS ONLY SMOKING A PACK PER WEEK   Vaping Use   • Vaping Use: Never used   Substance and Sexual Activity   • Alcohol use: No   • Drug use: No   • Sexual activity: Yes     Partners: Male     Birth control/protection: Surgical     Comment:  vasectomy      Family History   Problem Relation Age of Onset   • Cancer Mother         breast   • Bone cancer Father    • Cancer Maternal Aunt         breast   • Diabetes Maternal Uncle    • No Known Problems Brother    • Diabetes Maternal Grandfather    • Ovarian cancer Paternal Aunt        Review of Systems   Constitutional: Negative for chills, diaphoresis and fever.   Gastrointestinal: Negative.    Genitourinary: Negative for dysuria, menstrual problem  "and pelvic pain.           Objective   /72   Ht 177.8 cm (70\")   Wt 135 kg (297 lb 3.2 oz)   Breastfeeding No   BMI 42.64 kg/m²     Physical Exam  Constitutional:       Appearance: Normal appearance. She is well-developed and well-groomed. She is obese.   Eyes:      General: Lids are normal.      Extraocular Movements: Extraocular movements intact.      Conjunctiva/sclera: Conjunctivae normal.   Neck:      Thyroid: No thyroid mass or thyromegaly.   Chest:      Breasts: Breasts are symmetrical.         Right: No inverted nipple, mass, nipple discharge, skin change or tenderness.         Left: No inverted nipple, mass, nipple discharge, skin change or tenderness.   Abdominal:      General: There is no distension.      Palpations: Abdomen is soft. There is no hepatomegaly or splenomegaly.      Tenderness: There is no abdominal tenderness.   Genitourinary:     Exam position: Lithotomy position.      Labia:         Right: No rash, tenderness or lesion.         Left: No rash, tenderness or lesion.       Urethra: No prolapse, urethral pain, urethral swelling or urethral lesion.      Vagina: No vaginal discharge, tenderness or lesions.      Cervix: No cervical motion tenderness, discharge, friability or lesion.      Uterus: Not enlarged and not tender.       Adnexa:         Right: No mass or tenderness.          Left: No mass or tenderness.     Musculoskeletal:      Cervical back: Neck supple.   Lymphadenopathy:      Upper Body:      Right upper body: No axillary adenopathy.      Left upper body: No axillary adenopathy.   Skin:     General: Skin is warm and dry.      Findings: No lesion.   Neurological:      General: No focal deficit present.      Mental Status: She is alert and oriented to person, place, and time.   Psychiatric:         Attention and Perception: Attention normal.         Mood and Affect: Mood normal.         Speech: Speech normal.         Behavior: Behavior normal.         Thought Content: " Thought content normal.            Result Review :                   Assessment and Plan  Diagnoses and all orders for this visit:    1. Encounter for gynecological examination without abnormal finding (Primary)    2. Screening for cervical cancer  -     Pap IG, Rfx HPV ASCU; Future    3. Encounter for screening mammogram for malignant neoplasm of breast  -     Mammo Screening Digital Tomosynthesis Bilateral With CAD; Future      Patient Instructions   Self breast exam monthly  Annual screening mammogram   Regular exercise                This note was electronically signed.    Pat Parker PA-C   July 21, 2021

## 2021-07-30 DIAGNOSIS — Z12.4 SCREENING FOR CERVICAL CANCER: ICD-10-CM

## 2022-10-06 ENCOUNTER — OFFICE VISIT (OUTPATIENT)
Dept: OBSTETRICS AND GYNECOLOGY | Facility: CLINIC | Age: 42
End: 2022-10-06

## 2022-10-06 VITALS
BODY MASS INDEX: 41.95 KG/M2 | SYSTOLIC BLOOD PRESSURE: 140 MMHG | HEIGHT: 70 IN | DIASTOLIC BLOOD PRESSURE: 82 MMHG | WEIGHT: 293 LBS

## 2022-10-06 DIAGNOSIS — Z01.419 WELL WOMAN EXAM WITH ROUTINE GYNECOLOGICAL EXAM: Primary | ICD-10-CM

## 2022-10-06 DIAGNOSIS — Z12.31 ENCOUNTER FOR SCREENING MAMMOGRAM FOR MALIGNANT NEOPLASM OF BREAST: ICD-10-CM

## 2022-10-06 DIAGNOSIS — Z12.4 SCREENING FOR CERVICAL CANCER: ICD-10-CM

## 2022-10-06 PROCEDURE — 99396 PREV VISIT EST AGE 40-64: CPT | Performed by: PHYSICIAN ASSISTANT

## 2022-10-06 NOTE — PROGRESS NOTES
"Subjective   Chief Complaint   Patient presents with   • Gynecologic Exam     Last pap done 21-WNL, MMG is due, No complaints       Maria Murray is a 42 y.o. year old  presenting to be seen for her annual gynecological exam.   She has no complaints or concerns  She had endometrial ablation several years ago and has occasional light random spotting.  She has never had a screening mammogram.  Family history of breast cancer with her mother  Sees her PCP routinely    Past Medical History:   Diagnosis Date   • Abnormal Pap smear of cervix 2002   • Alcoholism ([facility])    • Ankle sprain     LEFT AND RIGHT   • Anxiety    • Arthritis    • Bipolar disorder ([facility])    • Bursitis of hip     RIGHT   • Celiac disease    • Chronic pain disorder    • Clotting disorder ([facility]) 2008   • Depression 1998   • Dislocation, hip ([facility])     RIGHT   • Fracture     LEFT FOOT IN THE PAST   • Fracture, patella     LEFT   • GERD (gastroesophageal reflux disease)    • H/O exercise stress test     STATES \"IT WAS OK\"   • H/O seasonal allergies    • Heart attack ([facility])     STATES HAD MI AFTER CHILDBIRTH-STATES DUE TO HYPOVOLEMIA   • History of transfusion     REPORTS DID NOT HAVE A REACTION TO TRANSFUSION   • HPV (human papilloma virus) infection    • Migraine    • Obstructive sleep apnea    • Palpitations    • PCOS (polycystic ovarian syndrome)    • Polycystic ovary syndrome    • Rh incompatibility    • Sore throat    • Strep throat    • Subluxation of patella    • Substance abuse ([facility]) 1994    sober 06/15/1998   • Tear of meniscus of knee     LEFT   • Thyroid disease    • Urinary symptom or sign    • Varicella    • Wears glasses         Current Outpatient Medications:   •  fluticasone (FLONASE) 50 MCG/ACT nasal spray, SPRAY 2 SPRAY BY INTRANASAL ROUTE EVERY DAY IN EACH NOSTRIL, Disp: , Rfl:   •  lamoTRIgine (LaMICtal) 150 MG tablet, TAKE ONE-HALF TABLET (75MG) BY MOUTH TWICE DAILY, Disp: , Rfl: 5  •  " levocetirizine (XYZAL) 5 MG tablet, Take 5 mg by mouth Every Evening., Disp: , Rfl:   •  THYROIDTHROID 48.75 MG tablet tablet, Take 48.75 mg by mouth Daily., Disp: , Rfl: 2  •  venlafaxine (EFFEXOR) 37.5 MG tablet, Take 37.5 mg by mouth 2 (Two) Times a Day., Disp: , Rfl:    Allergies   Allergen Reactions   • Other GI Intolerance     REPORTS ALLERGY TO GLUTEN     • Penicillins Hives   • Zithromax [Azithromycin] Anxiety      Past Surgical History:   Procedure Laterality Date   • CERVICAL BIOPSY  W/ LOOP ELECTRODE EXCISION     •  SECTION     • D & C HYSTEROSCOPY ENDOMETRIAL ABLATION N/A 2017    Procedure: DILATATION AND CURETTAGE HYSTEROSCOPY NOVASURE ENDOMETRIAL ABLATION;  Surgeon: Donna Fuentes MD;  Location: [facility] OR;  Service:    • DILATATION AND CURETTAGE      PATIENT REPORTS X2   • ENDOMETRIAL ABLATION     • ENDOSCOPY     • KNEE ARTHROSCOPY Right 2017    Procedure: RIGHT KNEE DIAGNOSTIC ARTHROSCOPY WITH PARTIAL LATERAL MENISECTOMY, CHONDRAL SHAVING PATELLA;  Surgeon: Dejan Rodas MD;  Location: [facility] OR;  Service:    • KNEE SURGERY Left      Left ATS   • WISDOM TOOTH EXTRACTION        Social History     Socioeconomic History   • Marital status:    Tobacco Use   • Smoking status: Former Smoker     Packs/day: 0.50     Years: 10.00     Pack years: 5.00     Types: Cigarettes     Start date: 1995     Quit date: 2011     Years since quittin.0   • Smokeless tobacco: Never Used   • Tobacco comment: QUIT in , REPORTS WAS ONLY SMOKING A PACK PER WEEK   Vaping Use   • Vaping Use: Never used   Substance and Sexual Activity   • Alcohol use: No   • Drug use: Defer   • Sexual activity: Yes     Partners: Male     Birth control/protection: Surgical     Comment: vasectomy      Family History   Problem Relation Age of Onset   • Cancer Mother         breast   • Breast cancer Mother    • Uterine cancer Mother    • Osteoporosis Mother    • Bone cancer Father    • Cancer Maternal  "Aunt         breast   • Diabetes Maternal Uncle    • No Known Problems Brother    • Diabetes Maternal Grandfather         type 1   • Ovarian cancer Paternal Aunt    • Ovarian cancer Paternal Aunt    • Diabetes Maternal Uncle         type 2       Review of Systems   Constitutional: Negative for chills, diaphoresis and fever.   Gastrointestinal: Negative.    Genitourinary: Negative for difficulty urinating, dysuria, menstrual problem and pelvic pain.           Objective   /82   Ht 177.8 cm (70\")   Wt 135 kg (296 lb 9.6 oz)   Breastfeeding No   BMI 42.56 kg/m²     Physical Exam  Constitutional:       Appearance: Normal appearance. She is well-developed and well-groomed. She is morbidly obese.   Eyes:      General: Lids are normal.      Extraocular Movements: Extraocular movements intact.      Conjunctiva/sclera: Conjunctivae normal.   Neck:      Thyroid: No thyroid mass or thyromegaly.   Chest:   Breasts: Breasts are symmetrical.      Right: No inverted nipple, mass, nipple discharge, skin change, tenderness or axillary adenopathy.      Left: No inverted nipple, mass, nipple discharge, skin change, tenderness or axillary adenopathy.       Abdominal:      General: There is no distension.      Palpations: Abdomen is soft. There is no hepatomegaly or splenomegaly.      Tenderness: There is no abdominal tenderness.   Genitourinary:     Exam position: Lithotomy position.      Labia:         Right: No rash, tenderness or lesion.         Left: No rash, tenderness or lesion.       Urethra: No prolapse, urethral pain, urethral swelling or urethral lesion.      Vagina: No vaginal discharge, tenderness or lesions.      Cervix: No cervical motion tenderness, discharge, friability or lesion.      Uterus: Not enlarged and not tender.       Adnexa:         Right: No mass or tenderness.          Left: No mass or tenderness.     Musculoskeletal:      Cervical back: Neck supple.   Lymphadenopathy:      Upper Body:      Right " upper body: No axillary adenopathy.      Left upper body: No axillary adenopathy.   Skin:     General: Skin is warm and dry.      Findings: No lesion.   Neurological:      General: No focal deficit present.      Mental Status: She is alert and oriented to person, place, and time.   Psychiatric:         Attention and Perception: Attention normal.         Mood and Affect: Mood normal.         Speech: Speech normal.         Behavior: Behavior normal.         Thought Content: Thought content normal.            Result Review :                   Assessment and Plan  Diagnoses and all orders for this visit:    1. Well woman exam with routine gynecological exam (Primary)    2. Screening for cervical cancer    3. Encounter for screening mammogram for malignant neoplasm of breast  -     Mammo Screening Digital Tomosynthesis Bilateral With CAD; Future      Patient Instructions   Self breast exam monthly  Annual screening mammogram   Regular exercise               This note was electronically signed.    Pat Parker PA-C   October 6, 2022

## 2022-10-10 LAB — REF LAB TEST METHOD: NORMAL

## 2023-03-27 ENCOUNTER — HOSPITAL ENCOUNTER (OUTPATIENT)
Dept: MAMMOGRAPHY | Facility: HOSPITAL | Age: 43
Discharge: HOME | End: 2023-03-27
Admitting: PHYSICIAN ASSISTANT
Payer: MEDICAID

## 2023-03-27 DIAGNOSIS — R92.8 ABNORMAL SCREENING MAMMOGRAM: Primary | ICD-10-CM

## 2023-03-27 DIAGNOSIS — Z12.31 ENCOUNTER FOR SCREENING MAMMOGRAM FOR MALIGNANT NEOPLASM OF BREAST: ICD-10-CM

## 2023-03-27 PROCEDURE — 77063 BREAST TOMOSYNTHESIS BI: CPT

## 2023-03-27 PROCEDURE — 77067 SCR MAMMO BI INCL CAD: CPT

## 2023-03-29 DIAGNOSIS — R92.8 ABNORMAL SCREENING MAMMOGRAM: Primary | ICD-10-CM

## 2023-04-18 ENCOUNTER — TELEPHONE (OUTPATIENT)
Dept: OBSTETRICS AND GYNECOLOGY | Facility: CLINIC | Age: 43
End: 2023-04-18
Payer: MEDICAID

## 2023-04-18 RX ORDER — FLUCONAZOLE 150 MG/1
TABLET ORAL
Qty: 2 TABLET | Refills: 0 | Status: SHIPPED | OUTPATIENT
Start: 2023-04-18

## 2023-04-18 NOTE — TELEPHONE ENCOUNTER
----- Message from Lelo Medina sent at 4/18/2023  9:34 AM EDT -----  Pt thinks she has a yeast infection. She asked for Diflucan.    Lauryn's pt    RX: [facility]

## 2023-08-02 ENCOUNTER — TRANSCRIBE ORDERS (OUTPATIENT)
Dept: ADMINISTRATIVE | Facility: HOSPITAL | Age: 43
End: 2023-08-02
Payer: MEDICAID

## 2023-08-02 DIAGNOSIS — R92.8 ABNORMAL MAMMOGRAM OF LEFT BREAST: Primary | ICD-10-CM

## 2023-12-15 ENCOUNTER — TRANSCRIBE ORDERS (OUTPATIENT)
Dept: ADMINISTRATIVE | Facility: HOSPITAL | Age: 43
End: 2023-12-15
Payer: MEDICAID

## 2023-12-22 ENCOUNTER — HOSPITAL ENCOUNTER (OUTPATIENT)
Dept: MAMMOGRAPHY | Facility: HOSPITAL | Age: 43
Discharge: HOME | End: 2023-12-22
Payer: MEDICAID

## 2023-12-22 ENCOUNTER — HOSPITAL ENCOUNTER (OUTPATIENT)
Dept: ULTRASOUND IMAGING | Facility: HOSPITAL | Age: 43
Discharge: HOME | End: 2023-12-22
Payer: MEDICAID

## 2023-12-22 DIAGNOSIS — R92.8 ABNORMAL MAMMOGRAM OF LEFT BREAST: ICD-10-CM

## 2023-12-22 PROCEDURE — G0279 TOMOSYNTHESIS, MAMMO: HCPCS

## 2023-12-22 PROCEDURE — 76642 ULTRASOUND BREAST LIMITED: CPT

## 2023-12-22 PROCEDURE — 77065 DX MAMMO INCL CAD UNI: CPT

## 2024-01-22 NOTE — PROGRESS NOTES
Patient: Maria Murray  YOB: 1980    Date: 01/23/2024    Primary Care Provider: Omid Brody PA    Patient in office today for a 6 month follow up on an abnormal mammogram with ultra sound.  Patient states***No chief complaint on file.      History: ***    {Hx  Review:18402}    Review of Systems   Constitutional:  Negative for chills, fever and unexpected weight change.   HENT:  Negative for hearing loss, trouble swallowing and voice change.    Eyes:  Negative for visual disturbance.   Respiratory:  Negative for apnea, cough, chest tightness, shortness of breath and wheezing.    Cardiovascular:  Negative for chest pain, palpitations and leg swelling.   Gastrointestinal:  Negative for abdominal distention, abdominal pain, anal bleeding, blood in stool, constipation, diarrhea, nausea, rectal pain and vomiting.   Endocrine: Negative for cold intolerance and heat intolerance.   Genitourinary:  Negative for difficulty urinating, dysuria and flank pain.   Musculoskeletal:  Negative for back pain and gait problem.   Skin:  Negative for color change, rash and wound.   Neurological:  Negative for dizziness, syncope, speech difficulty, weakness, light-headedness, numbness and headaches.   Hematological:  Negative for adenopathy. Does not bruise/bleed easily.   Psychiatric/Behavioral:  Negative for confusion. The patient is not nervous/anxious.        Vital Signs  There were no vitals filed for this visit.    Allergies:  Allergies   Allergen Reactions    Other GI Intolerance     REPORTS ALLERGY TO GLUTEN      Penicillins Hives    Zithromax [Azithromycin] Anxiety       Medications:    Current Outpatient Medications:     lamoTRIgine (LaMICtal) 150 MG tablet, TAKE ONE-HALF TABLET (75MG) BY MOUTH TWICE DAILY, Disp: , Rfl: 5    levocetirizine (XYZAL) 5 MG tablet, Take 1 tablet by mouth Every Evening., Disp: , Rfl:     montelukast (SINGULAIR) 10 MG tablet, , Disp: , Rfl:     THYROIDTHROID 48.75 MG tablet  "tablet, Take 1 tablet by mouth Daily., Disp: , Rfl: 2    triamcinolone (KENALOG) 0.1 % ointment, , Disp: , Rfl:     venlafaxine (EFFEXOR) 37.5 MG tablet, Take 1 tablet by mouth 2 (Two) Times a Day., Disp: , Rfl:     Physical Exam:   General Appearance:    Alert, cooperative, in no acute distress   Head:    Normocephalic, without obvious abnormality, atraumatic   Lungs:     Clear to auscultation,respirations regular, even and                  unlabored    Heart:    Regular rhythm and normal rate, normal S1 and S2, no            murmur, no gallop, no rub, no click   Abdomen:     Normal bowel sounds, no masses, no organomegaly, soft        non-tender, non-distended, no guarding, no rebound                tenderness   Extremities:   Moves all extremities well, no edema, no cyanosis, no             redness   Pulses:   Pulses palpable and equal bilaterally   Skin:   No bleeding, bruising or rash     Results Review:   {Results Review:41710::\"I reviewed the patient's new clinical results.\"}     {ROS review:38876}    ASSESSMENT/PLAN:    No diagnosis found.     Electronically signed by Alana Trinh MA  01/22/24      "

## 2024-01-22 NOTE — PROGRESS NOTES
"Patient: Maria Murray  YOB: 1980    Date: 01/23/2024    Primary Care Provider: Omid Brody PA    Chief Complaint   Patient presents with    Follow-up       History: Patient last year underwent baseline mammographic screening and was found to have an asymmetry in the left breast.  This is follow-up from that workup.  She had repeat mammogram and ultrasound.  She reports no changes.  No masses.  No skin changes or nipple discharge.  The following portions of the patient's history were reviewed and updated as appropriate: allergies, current medications, past family history, past medical history, past social history, past surgical history and problem list.    Review of Systems   Constitutional:  Negative for chills, fever and unexpected weight change.   HENT:  Negative for hearing loss, trouble swallowing and voice change.    Eyes:  Negative for visual disturbance.   Respiratory:  Negative for apnea, cough, chest tightness, shortness of breath and wheezing.    Cardiovascular:  Negative for chest pain, palpitations and leg swelling.   Gastrointestinal:  Negative for abdominal distention, abdominal pain, anal bleeding, blood in stool, constipation, diarrhea, nausea, rectal pain and vomiting.   Endocrine: Negative for cold intolerance and heat intolerance.   Genitourinary:  Negative for difficulty urinating, dysuria and flank pain.   Musculoskeletal:  Negative for back pain and gait problem.   Skin:  Negative for color change, rash and wound.   Neurological:  Negative for dizziness, syncope, speech difficulty, weakness, light-headedness, numbness and headaches.   Hematological:  Negative for adenopathy. Does not bruise/bleed easily.   Psychiatric/Behavioral:  Negative for confusion. The patient is not nervous/anxious.        Vital Signs  Vitals:    01/23/24 1021   BP: 116/82   Pulse: 74   Temp: 97.7 °F (36.5 °C)   SpO2: 99%   Weight: 134 kg (296 lb)   Height: 177.8 cm (70\") "       Allergies:  Allergies   Allergen Reactions    Other GI Intolerance     REPORTS ALLERGY TO GLUTEN      Penicillins Hives    Zithromax [Azithromycin] Anxiety       Medications:    Current Outpatient Medications:     albuterol sulfate  (90 Base) MCG/ACT inhaler, INHALE 2 PUFFS EVERY SIX HOURS AS NEEDED, Disp: , Rfl:     lamoTRIgine (LaMICtal) 150 MG tablet, TAKE ONE-HALF TABLET (75MG) BY MOUTH TWICE DAILY, Disp: , Rfl: 5    levocetirizine (XYZAL) 5 MG tablet, Take 1 tablet by mouth Every Evening., Disp: , Rfl:     THYROIDTHROID 48.75 MG tablet tablet, Take 1 tablet by mouth Daily., Disp: , Rfl: 2    venlafaxine (EFFEXOR) 37.5 MG tablet, Take 1 tablet by mouth 2 (Two) Times a Day., Disp: , Rfl:     montelukast (SINGULAIR) 10 MG tablet, , Disp: , Rfl:     triamcinolone (KENALOG) 0.1 % ointment, , Disp: , Rfl:     Physical Exam:   General Appearance:    Alert, cooperative, in no acute distress  Heart: Regular rate and rhythm  Breast exam: Bilateral breast exam was normal.   Results Review:   I reviewed the patient's new clinical results. Mammogram and ultrasound results were reviewed    Review of Systems was reviewed and confirmed as accurate as documented by the MA.    ASSESSMENT/PLAN:    1. Abnormal mammogram of left breast       Stable and likely benign findings on examination and ultrasound and mammogram.  Recommend repeating these in 6 months and follow-up at that time.    Electronically signed by Sravan Mitchell MD  01/23/24

## 2024-01-23 ENCOUNTER — OFFICE VISIT (OUTPATIENT)
Dept: SURGERY | Facility: CLINIC | Age: 44
End: 2024-01-23
Payer: MEDICAID

## 2024-01-23 VITALS
TEMPERATURE: 97.7 F | DIASTOLIC BLOOD PRESSURE: 82 MMHG | OXYGEN SATURATION: 99 % | SYSTOLIC BLOOD PRESSURE: 116 MMHG | HEIGHT: 70 IN | BODY MASS INDEX: 41.95 KG/M2 | WEIGHT: 293 LBS | HEART RATE: 74 BPM

## 2024-01-23 DIAGNOSIS — R92.8 ABNORMAL MAMMOGRAM OF LEFT BREAST: Primary | ICD-10-CM

## 2024-01-23 PROCEDURE — 1159F MED LIST DOCD IN RCRD: CPT | Performed by: SURGERY

## 2024-01-23 PROCEDURE — 99213 OFFICE O/P EST LOW 20 MIN: CPT | Performed by: SURGERY

## 2024-01-23 PROCEDURE — 1160F RVW MEDS BY RX/DR IN RCRD: CPT | Performed by: SURGERY

## 2024-01-23 RX ORDER — ALBUTEROL SULFATE 90 UG/1
AEROSOL, METERED RESPIRATORY (INHALATION)
COMMUNITY
Start: 2023-10-06

## 2024-05-07 ENCOUNTER — TRANSCRIBE ORDERS (OUTPATIENT)
Dept: ADMINISTRATIVE | Facility: HOSPITAL | Age: 44
End: 2024-05-07
Payer: MEDICAID

## 2024-05-21 ENCOUNTER — TRANSCRIBE ORDERS (OUTPATIENT)
Dept: ADMINISTRATIVE | Facility: HOSPITAL | Age: 44
End: 2024-05-21
Payer: MEDICAID

## 2024-05-21 DIAGNOSIS — R92.8 ABNORMAL MAMMOGRAM: Primary | ICD-10-CM

## 2024-06-17 ENCOUNTER — HOSPITAL ENCOUNTER (OUTPATIENT)
Dept: MAMMOGRAPHY | Facility: HOSPITAL | Age: 44
Discharge: HOME | End: 2024-06-17
Admitting: RADIOLOGY
Payer: MEDICAID

## 2024-06-17 DIAGNOSIS — R92.8 ABNORMAL MAMMOGRAM: ICD-10-CM

## 2024-06-17 PROCEDURE — G0279 TOMOSYNTHESIS, MAMMO: HCPCS

## 2024-06-17 PROCEDURE — 77066 DX MAMMO INCL CAD BI: CPT

## 2024-07-16 NOTE — PROGRESS NOTES
"Patient: Maria Murray  YOB: 1980    Date: 07/23/2024    Primary Care Provider: Omid Brody PA    Chief Complaint   Patient presents with    Follow-up     6 month mammogram        History: The patient is in the office today for 6 month follow up of mammogram.  Patient without complaints today.  Doing well.  No breast issues.      The following portions of the patient's history were reviewed and updated as appropriate: allergies, current medications, past family history, past medical history, past social history, past surgical history and problem list.    Review of Systems   Constitutional:  Negative for chills, fever and unexpected weight change.   HENT:  Negative for hearing loss, trouble swallowing and voice change.    Eyes:  Negative for visual disturbance.   Respiratory:  Negative for apnea, cough, chest tightness, shortness of breath and wheezing.    Cardiovascular:  Negative for chest pain, palpitations and leg swelling.   Gastrointestinal:  Negative for abdominal distention, abdominal pain, anal bleeding, blood in stool, constipation, diarrhea, nausea, rectal pain and vomiting.   Endocrine: Negative for cold intolerance and heat intolerance.   Genitourinary:  Negative for difficulty urinating, dysuria and flank pain.   Musculoskeletal:  Negative for back pain and gait problem.   Skin:  Negative for color change, rash and wound.   Neurological:  Negative for dizziness, syncope, speech difficulty, weakness, light-headedness, numbness and headaches.   Hematological:  Negative for adenopathy. Does not bruise/bleed easily.   Psychiatric/Behavioral:  Negative for confusion. The patient is not nervous/anxious.        Vital Signs  Vitals:    07/23/24 1110   BP: 112/60   Pulse: 79   Temp: 98 °F (36.7 °C)   SpO2: 97%   Weight: (!) 137 kg (301 lb)   Height: 177.8 cm (70\")       Allergies:  Allergies   Allergen Reactions    Other GI Intolerance     REPORTS ALLERGY TO GLUTEN      Penicillins " Hives     Beta lactam allergy details  Antibiotic reaction: hives  Age at reaction: adult   Dose to reaction time: unknown  Reason for antibiotic: unknown  Epinephrine required for reaction?: unknown  Tolerated antibiotics: unknown        Zithromax [Azithromycin] Anxiety       Medications:    Current Outpatient Medications:     albuterol sulfate  (90 Base) MCG/ACT inhaler, INHALE 2 PUFFS EVERY SIX HOURS AS NEEDED, Disp: , Rfl:     fluticasone (FLONASE) 50 MCG/ACT nasal spray, , Disp: , Rfl:     lamoTRIgine (LaMICtal) 150 MG tablet, TAKE ONE-HALF TABLET (75MG) BY MOUTH TWICE DAILY, Disp: , Rfl: 5    levocetirizine (XYZAL) 5 MG tablet, Take 1 tablet by mouth Every Evening., Disp: , Rfl:     montelukast (SINGULAIR) 10 MG tablet, , Disp: , Rfl:     THYROIDTHROID 48.75 MG tablet tablet, Take 1 tablet by mouth Daily., Disp: , Rfl: 2    venlafaxine (EFFEXOR) 37.5 MG tablet, Take 1 tablet by mouth 2 (Two) Times a Day., Disp: , Rfl:     Physical Exam:    Breast exam: Bilateral breast exam was performed.  No abnormality appreciated.  No adenopathy appreciated.     Results Review:   I reviewed the patient's new clinical results. Ultrasound was reviewed    Review of Systems was reviewed and confirmed as accurate as documented by the MA.    ASSESSMENT/PLAN:    1. Abnormal mammogram of left breast    2. Enlarged lymph nodes in armpit       Patient with no abnormality seen on ultrasound today of the breast and mammogram was also normal.  Follow-up as needed regarding her abnormal mammogram history of the left breast.  She does have an almost 4 cm left axillary lymph node that was visualized on the ultrasound today.  It has benign characteristics.  Recommend follow-up in 6 weeks.    Electronically signed by Sravan Mitchell MD  07/23/24

## 2024-07-23 ENCOUNTER — OFFICE VISIT (OUTPATIENT)
Dept: SURGERY | Facility: CLINIC | Age: 44
End: 2024-07-23
Payer: MEDICAID

## 2024-07-23 VITALS
BODY MASS INDEX: 41.95 KG/M2 | DIASTOLIC BLOOD PRESSURE: 60 MMHG | TEMPERATURE: 98 F | HEIGHT: 70 IN | HEART RATE: 79 BPM | OXYGEN SATURATION: 97 % | SYSTOLIC BLOOD PRESSURE: 112 MMHG | WEIGHT: 293 LBS

## 2024-07-23 DIAGNOSIS — R59.0 ENLARGED LYMPH NODES IN ARMPIT: ICD-10-CM

## 2024-07-23 DIAGNOSIS — R92.8 ABNORMAL MAMMOGRAM OF LEFT BREAST: Primary | ICD-10-CM

## 2024-07-23 PROCEDURE — 1160F RVW MEDS BY RX/DR IN RCRD: CPT | Performed by: SURGERY

## 2024-07-23 PROCEDURE — 1159F MED LIST DOCD IN RCRD: CPT | Performed by: SURGERY

## 2024-07-23 PROCEDURE — 99213 OFFICE O/P EST LOW 20 MIN: CPT | Performed by: SURGERY

## 2024-07-23 RX ORDER — FLUTICASONE PROPIONATE 50 MCG
SPRAY, SUSPENSION (ML) NASAL
COMMUNITY
Start: 2024-07-16

## 2024-07-27 ENCOUNTER — HOSPITAL ENCOUNTER (EMERGENCY)
Facility: HOSPITAL | Age: 44
Discharge: HOME | End: 2024-07-27
Attending: STUDENT IN AN ORGANIZED HEALTH CARE EDUCATION/TRAINING PROGRAM
Payer: MEDICAID

## 2024-07-27 ENCOUNTER — APPOINTMENT (OUTPATIENT)
Dept: GENERAL RADIOLOGY | Facility: HOSPITAL | Age: 44
End: 2024-07-27
Payer: MEDICAID

## 2024-07-27 VITALS
DIASTOLIC BLOOD PRESSURE: 79 MMHG | SYSTOLIC BLOOD PRESSURE: 114 MMHG | RESPIRATION RATE: 18 BRPM | HEIGHT: 70 IN | TEMPERATURE: 98.2 F | BODY MASS INDEX: 41.95 KG/M2 | HEART RATE: 69 BPM | OXYGEN SATURATION: 97 % | WEIGHT: 293 LBS

## 2024-07-27 DIAGNOSIS — R07.9 CHEST PAIN, UNSPECIFIED TYPE: Primary | ICD-10-CM

## 2024-07-27 DIAGNOSIS — R00.2 PALPITATIONS: ICD-10-CM

## 2024-07-27 LAB
ALBUMIN SERPL-MCNC: 4.3 G/DL (ref 3.5–5.2)
ALBUMIN/GLOB SERPL: 1.3 G/DL
ALP SERPL-CCNC: 61 U/L (ref 39–117)
ALT SERPL W P-5'-P-CCNC: 15 U/L (ref 1–33)
ANION GAP SERPL CALCULATED.3IONS-SCNC: 13.1 MMOL/L (ref 5–15)
AST SERPL-CCNC: 13 U/L (ref 1–32)
BASOPHILS # BLD AUTO: 0.02 10*3/MM3 (ref 0–0.2)
BASOPHILS NFR BLD AUTO: 0.2 % (ref 0–1.5)
BILIRUB SERPL-MCNC: 0.3 MG/DL (ref 0–1.2)
BUN SERPL-MCNC: 11 MG/DL (ref 6–20)
BUN/CREAT SERPL: 15.1 (ref 7–25)
CALCIUM SPEC-SCNC: 9.5 MG/DL (ref 8.6–10.5)
CHLORIDE SERPL-SCNC: 100 MMOL/L (ref 98–107)
CO2 SERPL-SCNC: 26.9 MMOL/L (ref 22–29)
CREAT SERPL-MCNC: 0.73 MG/DL (ref 0.57–1)
DEPRECATED RDW RBC AUTO: 38.5 FL (ref 37–54)
EGFRCR SERPLBLD CKD-EPI 2021: 103.5 ML/MIN/1.73
EOSINOPHIL # BLD AUTO: 0.14 10*3/MM3 (ref 0–0.4)
EOSINOPHIL NFR BLD AUTO: 1.4 % (ref 0.3–6.2)
ERYTHROCYTE [DISTWIDTH] IN BLOOD BY AUTOMATED COUNT: 12.5 % (ref 12.3–15.4)
GEN 5 2HR TROPONIN T REFLEX: <6 NG/L
GLOBULIN UR ELPH-MCNC: 3.2 GM/DL
GLUCOSE SERPL-MCNC: 123 MG/DL (ref 65–99)
HCT VFR BLD AUTO: 40.8 % (ref 34–46.6)
HGB BLD-MCNC: 13.4 G/DL (ref 12–15.9)
HOLD SPECIMEN: NORMAL
HOLD SPECIMEN: NORMAL
IMM GRANULOCYTES # BLD AUTO: 0.03 10*3/MM3 (ref 0–0.05)
IMM GRANULOCYTES NFR BLD AUTO: 0.3 % (ref 0–0.5)
LYMPHOCYTES # BLD AUTO: 2.16 10*3/MM3 (ref 0.7–3.1)
LYMPHOCYTES NFR BLD AUTO: 21.6 % (ref 19.6–45.3)
MAGNESIUM SERPL-MCNC: 1.9 MG/DL (ref 1.6–2.6)
MCH RBC QN AUTO: 27.6 PG (ref 26.6–33)
MCHC RBC AUTO-ENTMCNC: 32.8 G/DL (ref 31.5–35.7)
MCV RBC AUTO: 84.1 FL (ref 79–97)
MONOCYTES # BLD AUTO: 0.51 10*3/MM3 (ref 0.1–0.9)
MONOCYTES NFR BLD AUTO: 5.1 % (ref 5–12)
NEUTROPHILS NFR BLD AUTO: 7.13 10*3/MM3 (ref 1.7–7)
NEUTROPHILS NFR BLD AUTO: 71.4 % (ref 42.7–76)
NRBC BLD AUTO-RTO: 0 /100 WBC (ref 0–0.2)
PLATELET # BLD AUTO: 356 10*3/MM3 (ref 140–450)
PMV BLD AUTO: 11.8 FL (ref 6–12)
POTASSIUM SERPL-SCNC: 3.4 MMOL/L (ref 3.5–5.2)
PROT SERPL-MCNC: 7.5 G/DL (ref 6–8.5)
RBC # BLD AUTO: 4.85 10*6/MM3 (ref 3.77–5.28)
SODIUM SERPL-SCNC: 140 MMOL/L (ref 136–145)
TROPONIN T DELTA: NORMAL
TROPONIN T SERPL HS-MCNC: <6 NG/L
TSH SERPL DL<=0.05 MIU/L-ACNC: 1.5 UIU/ML (ref 0.27–4.2)
WBC NRBC COR # BLD AUTO: 9.99 10*3/MM3 (ref 3.4–10.8)
WHOLE BLOOD HOLD COAG: NORMAL
WHOLE BLOOD HOLD SPECIMEN: NORMAL

## 2024-07-27 PROCEDURE — 71045 X-RAY EXAM CHEST 1 VIEW: CPT

## 2024-07-27 PROCEDURE — 25810000003 SODIUM CHLORIDE 0.9 % SOLUTION

## 2024-07-27 PROCEDURE — 96374 THER/PROPH/DIAG INJ IV PUSH: CPT

## 2024-07-27 PROCEDURE — 83735 ASSAY OF MAGNESIUM: CPT

## 2024-07-27 PROCEDURE — 25010000002 ONDANSETRON PER 1 MG

## 2024-07-27 PROCEDURE — 84484 ASSAY OF TROPONIN QUANT: CPT | Performed by: STUDENT IN AN ORGANIZED HEALTH CARE EDUCATION/TRAINING PROGRAM

## 2024-07-27 PROCEDURE — 93005 ELECTROCARDIOGRAM TRACING: CPT | Performed by: STUDENT IN AN ORGANIZED HEALTH CARE EDUCATION/TRAINING PROGRAM

## 2024-07-27 PROCEDURE — 80050 GENERAL HEALTH PANEL: CPT

## 2024-07-27 PROCEDURE — 99284 EMERGENCY DEPT VISIT MOD MDM: CPT

## 2024-07-27 PROCEDURE — 36415 COLL VENOUS BLD VENIPUNCTURE: CPT

## 2024-07-27 PROCEDURE — 96361 HYDRATE IV INFUSION ADD-ON: CPT

## 2024-07-27 RX ORDER — POTASSIUM CHLORIDE 750 MG/1
40 CAPSULE, EXTENDED RELEASE ORAL ONCE
Status: COMPLETED | OUTPATIENT
Start: 2024-07-27 | End: 2024-07-27

## 2024-07-27 RX ORDER — ONDANSETRON 2 MG/ML
4 INJECTION INTRAMUSCULAR; INTRAVENOUS ONCE
Status: COMPLETED | OUTPATIENT
Start: 2024-07-27 | End: 2024-07-27

## 2024-07-27 RX ORDER — ASPIRIN 325 MG
325 TABLET ORAL ONCE
Status: COMPLETED | OUTPATIENT
Start: 2024-07-27 | End: 2024-07-27

## 2024-07-27 RX ADMIN — SODIUM CHLORIDE 500 ML: 9 INJECTION, SOLUTION INTRAVENOUS at 19:45

## 2024-07-27 RX ADMIN — ONDANSETRON 4 MG: 2 INJECTION INTRAMUSCULAR; INTRAVENOUS at 19:45

## 2024-07-27 RX ADMIN — ASPIRIN 325 MG: 325 TABLET ORAL at 19:45

## 2024-07-27 RX ADMIN — POTASSIUM CHLORIDE 40 MEQ: 750 CAPSULE, EXTENDED RELEASE ORAL at 21:46

## 2024-07-27 NOTE — ED PROVIDER NOTES
Subjective  History of Present Illness:    This is a 44-year-old female, history of alcoholism, bipolar disorder, celiac disease, chronic pain disorder, presenting today for evaluation of chest pain.  Patient notes that she has been having left-sided chest pain and tightness that started about 30 minutes prior to arrival.  She reports that she was driving around when her watch told her that she was in A-fib and had a high heart rate, she reports that she was symptomatic with palpitations at that time and chest tightness and mild shortness of air.  She denies any unilateral leg pain or swelling, no hemoptysis, no pleuritic chest pain.  No history of A-fib.  Reports she saw her primary care this last Thursday 725 and did a cardiac workup for that was negative and was told to come to the ED if this happens again.  She does report a history of thyroid issues as well as anxiety.  Pain is nonradiating.  No ripping or tearing sensations.  Neurovascular intact bilaterally in upper and lower extremities.  She reports that she had an episode like this several days ago.      Nurses Notes reviewed and agree, including vitals, allergies, social history and prior medical history.     REVIEW OF SYSTEMS: All systems reviewed and not pertinent unless noted.  Review of Systems   Constitutional:  Negative for diaphoresis and fever.   Respiratory:  Positive for chest tightness and shortness of breath. Negative for cough.    Cardiovascular:  Positive for chest pain and palpitations. Negative for leg swelling.   Gastrointestinal:  Positive for nausea. Negative for abdominal pain and vomiting.   All other systems reviewed and are negative.      Past Medical History:   Diagnosis Date    Abnormal Pap smear of cervix 02/2002    Alcoholism     Ankle sprain     LEFT AND RIGHT    Anxiety     Arthritis     Bipolar disorder     Breast mass 6/2023    Bursitis of hip     RIGHT    Celiac disease     Chronic pain disorder     Clotting disorder  "2008    Depression 1998    Dislocation, hip     RIGHT    Fracture     LEFT FOOT IN THE PAST    Fracture, patella     LEFT    GERD (gastroesophageal reflux disease)     H/O exercise stress test     STATES \"IT WAS OK\"    H/O seasonal allergies     Heart attack     STATES HAD MI AFTER CHILDBIRTH-STATES DUE TO HYPOVOLEMIA    History of transfusion     REPORTS DID NOT HAVE A REACTION TO TRANSFUSION    HPV (human papilloma virus) infection     Migraine     Obstructive sleep apnea     Palpitations     PCOS (polycystic ovarian syndrome)     Polycystic ovary syndrome     Rh incompatibility     Sore throat     Strep throat     Subluxation of patella     Substance abuse 1994    sober 06/15/1998    Tear of meniscus of knee     LEFT    Thyroid disease     Urinary symptom or sign     Varicella     Wears glasses        Allergies:    Other, Penicillins, and Zithromax [azithromycin]      Past Surgical History:   Procedure Laterality Date    CERVICAL BIOPSY  W/ LOOP ELECTRODE EXCISION       SECTION      D & C HYSTEROSCOPY ENDOMETRIAL ABLATION N/A 2017    Procedure: DILATATION AND CURETTAGE HYSTEROSCOPY NOVASURE ENDOMETRIAL ABLATION;  Surgeon: Donna Fuentes MD;  Location: [facility] OR;  Service:     DILATATION AND CURETTAGE      PATIENT REPORTS X2    ENDOMETRIAL ABLATION      ENDOSCOPY      KNEE ARTHROSCOPY Right 2017    Procedure: RIGHT KNEE DIAGNOSTIC ARTHROSCOPY WITH PARTIAL LATERAL MENISECTOMY, CHONDRAL SHAVING PATELLA;  Surgeon: Dejan Rodas MD;  Location: [facility] OR;  Service:     KNEE SURGERY Left      Left ATS    WISDOM TOOTH EXTRACTION           Social History     Socioeconomic History    Marital status:    Tobacco Use    Smoking status: Former     Current packs/day: 0.00     Average packs/day: 0.5 packs/day for 16.7 years (8.3 ttl pk-yrs)     Types: Cigarettes     Start date: 1995     Quit date: 2011     Years since quittin.8     Passive exposure: Past    " "Smokeless tobacco: Never    Tobacco comments:     QUIT in 2011, REPORTS WAS ONLY SMOKING A PACK PER WEEK   Vaping Use    Vaping status: Never Used   Substance and Sexual Activity    Alcohol use: Not Currently    Drug use: Yes     Types: Marijuana    Sexual activity: Yes     Partners: Male     Birth control/protection: Surgical     Comment: vasectomy         Family History   Problem Relation Age of Onset    Cancer Mother         Breast    Breast cancer Mother     Uterine cancer Mother     Osteoporosis Mother     Arthritis Mother     Hearing loss Mother     Bone cancer Father     Cancer Father         Bone. brain/lung    Early death Father     Alcohol abuse Brother     Breast cancer Maternal Aunt     Cancer Maternal Aunt         breast    Ovarian cancer Paternal Aunt     Ovarian cancer Paternal Aunt     Cancer Paternal Aunt         Ovarian    Diabetes Maternal Grandfather         type 1    Diabetes Maternal Uncle     Alcohol abuse Maternal Uncle     Diabetes Maternal Uncle         Type 2       Objective  Physical Exam:  /79 (BP Location: Left arm, Patient Position: Sitting)   Pulse 69   Temp 98.2 °F (36.8 °C) (Oral)   Resp 18   Ht 177.8 cm (70\")   Wt 134 kg (294 lb 9.6 oz)   SpO2 97%   BMI 42.27 kg/m²      Physical Exam  Vitals and nursing note reviewed.   Constitutional:       General: She is not in acute distress.     Appearance: She is well-developed. She is obese. She is not ill-appearing, toxic-appearing or diaphoretic.   HENT:      Head: Normocephalic and atraumatic.   Eyes:      Extraocular Movements: Extraocular movements intact.      Pupils: Pupils are equal, round, and reactive to light.   Cardiovascular:      Rate and Rhythm: Normal rate and regular rhythm.      Pulses:           Radial pulses are 2+ on the right side and 2+ on the left side.        Dorsalis pedis pulses are 2+ on the right side and 2+ on the left side.      Heart sounds: Normal heart sounds.   Pulmonary:      Effort: Pulmonary " effort is normal. No tachypnea, accessory muscle usage or respiratory distress.      Breath sounds: Normal breath sounds. No decreased breath sounds, wheezing, rhonchi or rales.   Chest:      Chest wall: No mass, deformity, tenderness or crepitus.   Abdominal:      Palpations: Abdomen is soft.      Tenderness: There is no abdominal tenderness. There is no guarding or rebound.   Musculoskeletal:         General: Normal range of motion.      Cervical back: Normal range of motion and neck supple.      Right lower leg: No tenderness. No edema.      Left lower leg: No tenderness. No edema.   Skin:     General: Skin is warm and dry.      Capillary Refill: Capillary refill takes less than 2 seconds.   Neurological:      General: No focal deficit present.      Mental Status: She is alert and oriented to person, place, and time.      Cranial Nerves: No cranial nerve deficit.      Motor: No weakness.   Psychiatric:         Mood and Affect: Mood is anxious.         Behavior: Behavior is not agitated.             Procedures    ED Course:    ED Course as of 07/28/24 0132   Sat Jul 27, 2024   2130 I have reviewed the mid-level provider(s) note and verbally discussed the case/plan of care.  I was available for consultation as needed at all times during the patient's visit in the Emergency Department.  I agree with the clinical impression, plan, and disposition unless otherwise stated in the MDM below.    ATTENDING ATTESTATION  HPI: 44-year-old female presents with chest pain and palpitations.  [employer] Watch reported that she was in A-fib just prior to arrival.    MDM: ED workup reviewed.    EKG per my interpretation normal sinus rhythm, rate 97, normal axis, no ST segment ovation or depression, normal T waves, normal QRS QTc intervals.    Labs reviewed.  CBC and CMP clinically unremarkable.  TSH normal.  Troponin negative x 2.    I have independently reviewed and interpreted the chest x-ray.  My interpretation is negative for  infiltrate or pneumothorax. [JS]   2141 EKG sinus rhythm rate of 97 no acute ischemic changes. [JR]      ED Course User Index  [JR] Abimael Dale PA-C  [JS] Papa Hicks DO       Lab Results (last 24 hours)       Procedure Component Value Units Date/Time    High Sensitivity Troponin T [[id number]]  (Normal) Collected: 07/27/24 1938    Specimen: Blood Updated: 07/27/24 2009     HS Troponin T <6 ng/L     Narrative:      High Sensitive Troponin T Reference Range:  <14.0 ng/L- Negative Female for AMI  <22.0 ng/L- Negative Male for AMI  >=14 - Abnormal Female indicating possible myocardial injury.  >=22 - Abnormal Male indicating possible myocardial injury.   Clinicians would have to utilize clinical acumen, EKG, Troponin, and serial changes to determine if it is an Acute Myocardial Infarction or myocardial injury due to an underlying chronic condition.         CBC Auto Differential [[id number]]  (Abnormal) Collected: 07/27/24 1938    Specimen: Blood Updated: 07/27/24 2035     WBC 9.99 10*3/mm3      RBC 4.85 10*6/mm3      Hemoglobin 13.4 g/dL      Hematocrit 40.8 %      MCV 84.1 fL      MCH 27.6 pg      MCHC 32.8 g/dL      RDW 12.5 %      RDW-SD 38.5 fl      MPV 11.8 fL      Platelets 356 10*3/mm3      Neutrophil % 71.4 %      Lymphocyte % 21.6 %      Monocyte % 5.1 %      Eosinophil % 1.4 %      Basophil % 0.2 %      Immature Grans % 0.3 %      Neutrophils, Absolute 7.13 10*3/mm3      Lymphocytes, Absolute 2.16 10*3/mm3      Monocytes, Absolute 0.51 10*3/mm3      Eosinophils, Absolute 0.14 10*3/mm3      Basophils, Absolute 0.02 10*3/mm3      Immature Grans, Absolute 0.03 10*3/mm3      nRBC 0.0 /100 WBC     Comprehensive Metabolic Panel [[id number]]  (Abnormal) Collected: 07/27/24 1938    Specimen: Blood Updated: 07/27/24 2051     Glucose 123 mg/dL      BUN 11 mg/dL      Creatinine 0.73 mg/dL      Sodium 140 mmol/L      Potassium 3.4 mmol/L      Chloride 100 mmol/L      CO2 26.9 mmol/L      Calcium 9.5 mg/dL       Total Protein 7.5 g/dL      Albumin 4.3 g/dL      ALT (SGPT) 15 U/L      AST (SGOT) 13 U/L      Alkaline Phosphatase 61 U/L      Total Bilirubin 0.3 mg/dL      Globulin 3.2 gm/dL      A/G Ratio 1.3 g/dL      BUN/Creatinine Ratio 15.1     Anion Gap 13.1 mmol/L      eGFR 103.5 mL/min/1.73     Narrative:      GFR Normal >60  Chronic Kidney Disease <60  Kidney Failure <15      TSH [[id number]]  (Normal) Collected: 07/27/24 1938    Specimen: Blood Updated: 07/27/24 2058     TSH 1.500 uIU/mL     Magnesium [[id number]]  (Normal) Collected: 07/27/24 1938    Specimen: Blood Updated: 07/27/24 2051     Magnesium 1.9 mg/dL     High Sensitivity Troponin T 2Hr [[id number]] Collected: 07/27/24 2145    Specimen: Blood Updated: 07/27/24 2206     HS Troponin T <6 ng/L      Troponin T Delta --     Comment: Unable to calculate.       Narrative:      High Sensitive Troponin T Reference Range:  <14.0 ng/L- Negative Female for AMI  <22.0 ng/L- Negative Male for AMI  >=14 - Abnormal Female indicating possible myocardial injury.  >=22 - Abnormal Male indicating possible myocardial injury.   Clinicians would have to utilize clinical acumen, EKG, Troponin, and serial changes to determine if it is an Acute Myocardial Infarction or myocardial injury due to an underlying chronic condition.                  CT Head Without Contrast    Result Date: 7/26/2024  HEAD CT     7/26/2024 12:49 PM HISTORY: Headache, left-sided tingling. COMPARISON: None. TECHNIQUE: Multiple axial CT images were performed from the foramen magnum to the vertex. Individualized dose reduction techniques using automated exposure control or adjustment of mA and/or kV according to the patient size were employed. FINDINGS: There is no evidence of acute hemorrhage, mass effect, or midline shift.  The ventricles are of normal size and contour. No masses are identified. No abnormal extra-axial fluid is seen.  The mastoid air cells are clear. The osseous structures are intact. Mild  mucosal thickening in the paranasal sinuses.    Impression: No evidence of acute hemorrhage, mass effect, or midline shift. Consider further evaluation with MRI. Images reviewed, interpreted, and dictated by DO NIGEL Henderson     Amount and/or Complexity of Data Reviewed  Clinical lab tests: reviewed  Tests in the medicine section of CPT®: reviewed        Initial impression of presenting illness: This is a 44-year-old female presenting today for evaluation of chest tightness and palpitations    DDX: includes but is not limited to: Arrhythmia, A-fib, pneumothorax, thyroid abnormality, electrolyte abnormality, pneumonia, NSTEMI, PE, others    Patient is Wells and PERC arteria negative.  Denies a history of PE or DVT, no hemoptysis, no lateral leg pain or swelling.  Denies recent immobilization    Patient arrives hemodynamically stable, nontachycardic, mildly hypertensive 163/101 afebrile not given nonhypoxic with vitals interpreted by myself.     Pertinent features from physical exam: 2+ radial and pedal pulses bilaterally.  Neurovascular intact.  Patient is well-appearing alert and orient x 4, cardiac auscultation regular and rhythm lungs were clear abdomen soft nontender nonreactive, cranial nerves II through XII gross intact without focal deficit..    Initial diagnostic plan: CBC CMP troponin magnesium TSH EKG chest x-ray    Results from initial plan were reviewed and interpreted by me revealing CMP with glucose 123 potassium 3.4, TSH normal, 2 troponins were negative.  TSH baseline 1.5 normal, magnesium normal CBC unremarkable.  EKG sinus rhythm rate of 97 no acute ischemic changes, chest x-ray my dependent interpretation no acute process.    Diagnostic information from other sources: Record reviewed    Interventions / Re-evaluation: Aspirin 325, Zofran, 500 cc bolus of fluids.  Patient remained asymptomatic throughout her stay here.  No evidence of A-fib or arrhythmia.  Stable for discharge.  Heart score  of 2, do not suspect ACS.  Will defer workup outpatient at this time.    Results/clinical rationale were discussed with patient at bedside.  Recommended follow-up with cardiology for further evaluation with potential Holter monitor.    Consultations/Discussion of results with other physicians: Staffed case with ED attending physician    Disposition plan: Discharge, return precautions were given.  Heart score of 2, do not suspect ACS.  Recommended decreasing caffeine intake as patient does report that she drinks quite a bit of coffee.  Caffeine may have a role in her palpitations.  However recommended follow-up with cardiology for further evaluation as well as her primary care and return for worsening symptoms.  -----    Final diagnoses:   Chest pain, unspecified type   Palpitations          Abimael Dale PA-C  07/27/24 2213       Abimael Dale PA-C  07/28/24 0132

## 2024-07-28 NOTE — DISCHARGE INSTRUCTIONS
Follow-up closely with your primary care physician, return for worsening symptoms, have attached a referral for cardiology.  Call to schedule follow-up appointment.  Reduce your caffeine intake as this may help with your palpitations.

## 2024-08-02 ENCOUNTER — OFFICE VISIT (OUTPATIENT)
Dept: CARDIOLOGY | Facility: CLINIC | Age: 44
End: 2024-08-02
Payer: MEDICAID

## 2024-08-02 VITALS
OXYGEN SATURATION: 99 % | HEART RATE: 74 BPM | HEIGHT: 70 IN | BODY MASS INDEX: 41.95 KG/M2 | DIASTOLIC BLOOD PRESSURE: 72 MMHG | SYSTOLIC BLOOD PRESSURE: 118 MMHG | WEIGHT: 293 LBS

## 2024-08-02 DIAGNOSIS — E03.8 OTHER SPECIFIED HYPOTHYROIDISM: ICD-10-CM

## 2024-08-02 DIAGNOSIS — R03.0 ELEVATED BLOOD PRESSURE READING: ICD-10-CM

## 2024-08-02 DIAGNOSIS — R00.2 PALPITATIONS: Primary | ICD-10-CM

## 2024-08-02 DIAGNOSIS — E87.6 HYPOKALEMIA: ICD-10-CM

## 2024-08-02 DIAGNOSIS — G47.33 OSA ON CPAP: ICD-10-CM

## 2024-08-02 DIAGNOSIS — I25.2 HISTORY OF MYOCARDIAL INFARCTION: ICD-10-CM

## 2024-08-02 DIAGNOSIS — R07.89 CHEST PAIN, ATYPICAL: ICD-10-CM

## 2024-08-02 NOTE — PROGRESS NOTES
[facility] Cardiology     Outpatient New Patient / Consult Note    Maria Murray  [id number]  2024    Referred By: Abimael Dale PA-C    Chief Complaint   Patient presents with    Establish Care    Chest Pain       Subjective     History of Present Illness:   Maria Murray is a 44 y.o. female hypothyroidism, anxiety, SORAYA on CPAP, and obesity who presents to the Cardiology Clinic for evaluation of palpitations.  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  She has had palpitations chronically and has ignored a lot of her symptoms for quite some time because she also has GERD and anxiety, therefore, attributes her symptoms to those 2 reasons.  However, recently, she checked her watch which alerted her to atrial fibrillation a few times.  Her primary care recommended that she go to the ER to be evaluated.  Workup was negative so she was sent to cardiology for further evaluation.  Patient says she normally has no chest pain or dyspnea with exertion.  She did have some chest tightness and a blood pressure over 200 in the ER.  Denies any orthopnea, PND, or leg swelling.    Past Cardiac Testin. Last Coronary Angio: none  2. Last Echo: none   3. Prior Stress Testing: none  4. Prior Holter Monitor: none    Review of Systems:  Review of Systems   Constitutional: Negative.    Respiratory:  Positive for chest tightness. Negative for cough and shortness of breath.    Cardiovascular:  Positive for chest pain and palpitations. Negative for leg swelling.   Gastrointestinal: Negative.    Musculoskeletal: Negative.    Neurological: Negative.        Past Medical History:   Diagnosis Date    Abnormal Pap smear of cervix 2002    Alcoholism     Ankle sprain     LEFT AND RIGHT    Anxiety     Arthritis     Bipolar disorder     Breast mass 2023    Bursitis of hip     RIGHT    Celiac disease     Chronic pain disorder     Clotting disorder 2008    Depression  "1998    Dislocation, hip     RIGHT    Fracture     LEFT FOOT IN THE PAST    Fracture, patella     LEFT    GERD (gastroesophageal reflux disease)     H/O exercise stress test     STATES \"IT WAS OK\"    H/O seasonal allergies     Heart attack     STATES HAD MI AFTER CHILDBIRTH-STATES DUE TO HYPOVOLEMIA    History of transfusion     REPORTS DID NOT HAVE A REACTION TO TRANSFUSION    HPV (human papilloma virus) infection     Migraine     Obstructive sleep apnea     Palpitations     PCOS (polycystic ovarian syndrome)     Polycystic ovary syndrome     Rh incompatibility     Sore throat     Strep throat     Subluxation of patella     Substance abuse 1994    sober 06/15/1998    Tear of meniscus of knee     LEFT    Thyroid disease     Urinary symptom or sign     Varicella     Wears glasses        Past Surgical History:   Procedure Laterality Date    CERVICAL BIOPSY  W/ LOOP ELECTRODE EXCISION       SECTION      D & C HYSTEROSCOPY ENDOMETRIAL ABLATION N/A 2017    Procedure: DILATATION AND CURETTAGE HYSTEROSCOPY NOVASURE ENDOMETRIAL ABLATION;  Surgeon: Donna Fuentes MD;  Location: [facility] OR;  Service:     DILATATION AND CURETTAGE      PATIENT REPORTS X2    ENDOMETRIAL ABLATION      ENDOSCOPY      KNEE ARTHROSCOPY Right 2017    Procedure: RIGHT KNEE DIAGNOSTIC ARTHROSCOPY WITH PARTIAL LATERAL MENISECTOMY, CHONDRAL SHAVING PATELLA;  Surgeon: Dejan Rodas MD;  Location: [facility] OR;  Service:     KNEE SURGERY Left      Left ATS    WISDOM TOOTH EXTRACTION         Family History   Problem Relation Age of Onset    Cancer Mother         Breast    Breast cancer Mother     Uterine cancer Mother     Osteoporosis Mother     Arthritis Mother     Hearing loss Mother     Bone cancer Father     Cancer Father         Bone. brain/lung    Early death Father     Alcohol abuse Brother     Breast cancer Maternal Aunt     Cancer Maternal Aunt         breast    Ovarian cancer Paternal Aunt     Ovarian cancer " Paternal Aunt     Cancer Paternal Aunt         Ovarian    Diabetes Maternal Grandfather         type 1    Diabetes Maternal Uncle     Alcohol abuse Maternal Uncle     Diabetes Maternal Uncle         Type 2       Social History     Socioeconomic History    Marital status:    Tobacco Use    Smoking status: Former     Current packs/day: 0.00     Average packs/day: 0.5 packs/day for 16.7 years (8.3 ttl pk-yrs)     Types: Cigarettes     Start date: 1995     Quit date: 2011     Years since quittin.9     Passive exposure: Past    Smokeless tobacco: Never    Tobacco comments:     QUIT in , REPORTS WAS ONLY SMOKING A PACK PER WEEK   Vaping Use    Vaping status: Never Used   Substance and Sexual Activity    Alcohol use: Not Currently    Drug use: Yes     Types: Marijuana    Sexual activity: Yes     Partners: Male     Birth control/protection: Surgical     Comment: vasectomy         Current Outpatient Medications:     fluticasone (FLONASE) 50 MCG/ACT nasal spray, , Disp: , Rfl:     lamoTRIgine (LaMICtal) 150 MG tablet, TAKE ONE-HALF TABLET (75MG) BY MOUTH TWICE DAILY, Disp: , Rfl: 5    levocetirizine (XYZAL) 5 MG tablet, Take 1 tablet by mouth Every Evening., Disp: , Rfl:     montelukast (SINGULAIR) 10 MG tablet, , Disp: , Rfl:     THYROIDTHROID 48.75 MG tablet tablet, Take 1 tablet by mouth Daily., Disp: , Rfl: 2    venlafaxine (EFFEXOR) 37.5 MG tablet, Take 1 tablet by mouth 2 (Two) Times a Day., Disp: , Rfl:     albuterol sulfate  (90 Base) MCG/ACT inhaler, INHALE 2 PUFFS EVERY SIX HOURS AS NEEDED, Disp: , Rfl:     Allergies   Allergen Reactions    Other GI Intolerance     REPORTS ALLERGY TO GLUTEN      Penicillins Hives     Beta lactam allergy details  Antibiotic reaction: hives  Age at reaction: adult   Dose to reaction time: unknown  Reason for antibiotic: unknown  Epinephrine required for reaction?: unknown  Tolerated antibiotics: unknown        Zithromax [Azithromycin] Anxiety       "    Objective     Vitals:  Vitals:    08/02/24 0803   BP: 118/72   BP Location: Left arm   Patient Position: Sitting   Pulse: 74   SpO2: 99%   Weight: 133 kg (294 lb)   Height: 177.8 cm (70\")       Physical Exam:  Vitals reviewed.   Constitutional:       Appearance: Healthy appearance. Not in distress.   Neck:      Vascular: No JVD.   Pulmonary:      Effort: Pulmonary effort is normal.      Breath sounds: Normal breath sounds.   Cardiovascular:      Normal rate. Regular rhythm. Normal S1. Normal S2.       Murmurs: There is no murmur.      No gallop.  No click. No rub.   Pulses:     Intact distal pulses.   Edema:     Peripheral edema absent.   Abdominal:      General: There is no distension.      Palpations: Abdomen is soft.      Tenderness: There is no abdominal tenderness.   Skin:     General: Skin is warm and dry.         Results Review:   I reviewed the patient's new clinical results.  I reviewed the patient's new imaging results and agree with the interpretation.  I reviewed the patient's other test results and agree with the interpretation  I personally viewed and interpreted the patient's EKG/Telemetry data    Magnesium (07/27/2024 19:38)  TSH (07/27/2024 19:38)  Comprehensive Metabolic Panel (07/27/2024 19:38)  CBC Auto Differential (07/27/2024 19:38)  High Sensitivity Troponin T (07/27/2024 19:38)  HEMOGLOBIN A1C (07/25/2024 14:29)      ECG 12 Lead    Date/Time: 8/2/2024 9:26 AM  Performed by: Rufino Costello MD    Authorized by: Rufino Costello MD  Comparison: compared with previous ECG from 7/27/2024  Similar to previous ECG  Rhythm: sinus rhythm  Rate: normal  BPM: 74  Conduction: conduction normal  ST Segments: ST segments normal  T Waves: T waves normal  QRS axis: normal  Other: no other findings    Clinical impression: normal ECG             Assessment & Plan   Diagnoses and all orders for this visit:    1. Palpitations (Primary)  2. Hypokalemia  3. Other specified hypothyroidism  Palpitations are " "chronic with recent exacerbation.  ECG from the ER and the office are quite normal and shows sinus rhythm.  ER labs unremarkable and showed only a very mild hypokalemia.  TSH is normal.  Clinical exam is unremarkable with no evidence of heart failure.  I personally reviewed all of her telemetry strips from her watch and all of them appear to show sinus rhythm/tachycardia as P waves are present on all tracings.  Unlikely she had A-fib.  -- Will proceed with 30-day MCOT to screen for arrhythmias  -- Checking potassium  -- No strong indication for echo  -- Advised that she hydrate well and start eating a balanced diet.  Patient admits that she does not eat very well and drinks a lot of coffee.    4. Elevated blood pressure reading  5. Chest pain, atypical  Isolated event in the setting of systolic readings in the 200s in the ER.  Pressure today is normal.  Baseline ECG is normal.  -- No indication for functional testing at this time.  Advised monitoring her blood pressure over time    6. SORAYA on CPAP  Encouraged nightly compliance with CPAP    7. History of myocardial infarction  Patient says she was diagnosed with a \"hypovolemic infarction\" around the time of pregnancy when she had significant bleeding years ago.  Never had chest pain.  Sounds like this may have been a type II, demand ischemia event.            Plan   Orders Placed This Encounter   Procedures    Basic Metabolic Panel     Standing Status:   Future     Standing Expiration Date:   8/2/2025     Order Specific Question:   Release to patient     Answer:   Routine Release [[id number]]    Mobile Cardiac Outpatient Telemetry     30 days     Standing Status:   Future     Number of Occurrences:   1     Standing Expiration Date:   8/2/2025     Order Specific Question:   Reason for exam?     Answer:   Palpitations     Order Specific Question:   Release to patient     Answer:   Routine Release [[id number]]    ECG 12 Lead     This order was created via procedure " documentation     Order Specific Question:   Release to patient     Answer:   Routine Release [[id number]]     Medications:    Preventative Cardiology:   Tobacco Cessation: N/A  Obstructive Sleep Apnea Screening: Completed  AAA Screening: Deffered  Peripheral Arterial Disease Screening: Deffered        Follow Up:   Return in about 3 months (around 11/2/2024).    Thank you for allowing me to participate in the care of your patient. Please to not hesitate to contact me with additional questions or concerns.    Rufino Costello MD  08/02/2024   08:03 EDT

## 2024-09-06 NOTE — PROGRESS NOTES
"Patient: Maria Murray  YOB: 1980    Date: 09/10/2024    Primary Care Provider: Omid Brody PA    Chief Complaint   Patient presents with    Follow-up     6 week axillary lymph node       History: The patient is in the office today in follow up from 6 wks left axillary lymph node.  Patient states no complaints today.  She has had no weight loss.  No fevers.  She is unable to palpate lymph nodes in her axilla.          The following portions of the patient's history were reviewed and updated as appropriate: allergies, current medications, past family history, past medical history, past social history, past surgical history and problem list.    Review of Systems   Constitutional:  Negative for chills, fever and unexpected weight change.   HENT:  Negative for hearing loss, trouble swallowing and voice change.    Eyes:  Negative for visual disturbance.   Respiratory:  Negative for apnea, cough, chest tightness, shortness of breath and wheezing.    Cardiovascular:  Negative for chest pain, palpitations and leg swelling.   Gastrointestinal:  Negative for abdominal distention, abdominal pain, anal bleeding, blood in stool, constipation, diarrhea, nausea, rectal pain and vomiting.   Endocrine: Negative for cold intolerance and heat intolerance.   Genitourinary:  Negative for difficulty urinating, dysuria and flank pain.   Musculoskeletal:  Negative for back pain and gait problem.   Skin:  Negative for color change, rash and wound.   Neurological:  Negative for dizziness, syncope, speech difficulty, weakness, light-headedness, numbness and headaches.   Hematological:  Negative for adenopathy. Does not bruise/bleed easily.   Psychiatric/Behavioral:  Negative for confusion. The patient is not nervous/anxious.        Vital Signs  Vitals:    09/10/24 0957   BP: 110/68   Pulse: 58   Temp: 98.6 °F (37 °C)   SpO2: 96%   Weight: 134 kg (295 lb)   Height: 177.8 cm (70\")       Allergies:  Allergies "   Allergen Reactions    Other GI Intolerance     REPORTS ALLERGY TO GLUTEN      Penicillins Hives     Beta lactam allergy details  Antibiotic reaction: hives  Age at reaction: adult   Dose to reaction time: unknown  Reason for antibiotic: unknown  Epinephrine required for reaction?: unknown  Tolerated antibiotics: unknown        Zithromax [Azithromycin] Anxiety       Medications:    Current Outpatient Medications:     albuterol sulfate  (90 Base) MCG/ACT inhaler, INHALE 2 PUFFS EVERY SIX HOURS AS NEEDED, Disp: , Rfl:     fluticasone (FLONASE) 50 MCG/ACT nasal spray, , Disp: , Rfl:     lamoTRIgine (LaMICtal) 150 MG tablet, TAKE ONE-HALF TABLET (75MG) BY MOUTH TWICE DAILY, Disp: , Rfl: 5    levocetirizine (XYZAL) 5 MG tablet, Take 1 tablet by mouth Every Evening., Disp: , Rfl:     montelukast (SINGULAIR) 10 MG tablet, , Disp: , Rfl:     THYROIDTHROID 48.75 MG tablet tablet, Take 1 tablet by mouth Daily., Disp: , Rfl: 2    venlafaxine (EFFEXOR) 37.5 MG tablet, Take 1 tablet by mouth 2 (Two) Times a Day., Disp: , Rfl:     Physical Exam:    Neck/lymphatic: No cervical adenopathy.  No obvious adenopathy in either axilla.     Results Review:   I reviewed the patient's new clinical results.     Review of Systems was reviewed and confirmed as accurate as documented by the MA.    ASSESSMENT/PLAN:    1. Enlarged lymph nodes in armpit       Patient with enlarged lymph node found incidentally in the left axilla.  It measured 3.9 cm on last visit and today it measures 2.8 cm.  Benign appearance on ultrasound.  I like to follow her up in 3 months.  Sooner if she has any acute complaints.    Electronically signed by Sravan Mitchell MD  09/10/24

## 2024-09-10 ENCOUNTER — OFFICE VISIT (OUTPATIENT)
Dept: SURGERY | Facility: CLINIC | Age: 44
End: 2024-09-10
Payer: MEDICAID

## 2024-09-10 VITALS
SYSTOLIC BLOOD PRESSURE: 110 MMHG | TEMPERATURE: 98.6 F | HEIGHT: 70 IN | BODY MASS INDEX: 41.95 KG/M2 | OXYGEN SATURATION: 96 % | HEART RATE: 58 BPM | DIASTOLIC BLOOD PRESSURE: 68 MMHG | WEIGHT: 293 LBS

## 2024-09-10 DIAGNOSIS — R59.0 ENLARGED LYMPH NODES IN ARMPIT: Primary | ICD-10-CM

## 2024-09-10 PROCEDURE — 1160F RVW MEDS BY RX/DR IN RCRD: CPT | Performed by: SURGERY

## 2024-09-10 PROCEDURE — 1159F MED LIST DOCD IN RCRD: CPT | Performed by: SURGERY

## 2024-09-10 PROCEDURE — 99213 OFFICE O/P EST LOW 20 MIN: CPT | Performed by: SURGERY

## 2024-11-05 NOTE — PROGRESS NOTES
[facility] Cardiology Office Follow Up Note    Maria Murray  [id number]  2024    Primary Care Provider: Omid Brody PA    Chief Complaint   Patient presents with    Follow-up    Palpitations       Subjective     History of Present Illness:   Maria Murray is a 44 y.o. female with hypothyroidism, anxiety, SORAYA on CPAP, and obesity  who presents to the Cardiology Clinic for follow up of palpitations.  During last visit, we ordered an MCOT.  This turned out to be quite normal and showed no arrhythmias.  Patient forgot to get her potassium checked.  Says she is feeling fine and says her palpitations have decreased significantly.  There was no medical intervention.  The only change was that her job responsibilities have somewhat changed.  She is a traveling  and has not been traveling as much as before.  She is somewhat sedentary and does not really exercise outside her normal daily activities.  Denies exertional chest pain, dyspnea, orthopnea, PND, or leg swelling.  She is intermittently compliant with her CPAP.    Past Cardiac Testin. Last Coronary Angio: NA    2. Last Echo: Remote, unavailable    3. Prior Stress Testing: NA    4. Prior Holter Monitor: 24  Overall, low risk cardiac monitor.  There were 2 patient triggered/symptomatic events which correlated with sinus tachycardia.  No malignant arrhythmias or blocks detected.    Pyote of ectopy low.  Normal heart rate variability.       Review of Systems:  Review of Systems   Constitutional: Negative.    Respiratory: Negative.     Cardiovascular:  Positive for palpitations. Negative for chest pain and leg swelling.   Gastrointestinal: Negative.    Musculoskeletal: Negative.    Neurological: Negative.        I have reviewed and/or updated the patient's past medical, past surgical, family, social history, problem list and allergies as appropriate.       Current Outpatient Medications:     fluticasone  "(FLONASE) 50 MCG/ACT nasal spray, , Disp: , Rfl:     lamoTRIgine (LaMICtal) 150 MG tablet, TAKE ONE-HALF TABLET (75MG) BY MOUTH TWICE DAILY, Disp: , Rfl: 5    levocetirizine (XYZAL) 5 MG tablet, Take 1 tablet by mouth Every Evening., Disp: , Rfl:     montelukast (SINGULAIR) 10 MG tablet, , Disp: , Rfl:     THYROIDTHROID 48.75 MG tablet tablet, Take 1 tablet by mouth Daily., Disp: , Rfl: 2    venlafaxine (EFFEXOR) 37.5 MG tablet, Take 1 tablet by mouth 2 (Two) Times a Day., Disp: , Rfl:     albuterol sulfate  (90 Base) MCG/ACT inhaler, INHALE 2 PUFFS EVERY SIX HOURS AS NEEDED (Patient not taking: Reported on 11/7/2024), Disp: , Rfl:        Objective     Vitals:  Vitals:    11/07/24 0941   BP: 108/70   BP Location: Right arm   Patient Position: Sitting   Pulse: 65   SpO2: 97%   Weight: (!) 137 kg (303 lb)   Height: 177.8 cm (70\")       Physical Exam:  Vitals reviewed.   Constitutional:       Appearance: Healthy appearance. Not in distress.   Cardiovascular:      Normal rate. Regular rhythm. Normal S1. Normal S2.       Murmurs: There is no murmur.      No gallop.  No click. No rub.   Pulses:     Intact distal pulses.   Edema:     Peripheral edema absent.   Skin:     General: Skin is warm and dry.         Results Review:   I reviewed the patient's new clinical results.  I reviewed the patient's new imaging results and agree with the interpretation.  I reviewed the patient's other test results and agree with the interpretation    Procedures        Assessment & Plan   Diagnoses and all orders for this visit:    1. Palpitations (Primary)  2. Hypokalemia  MCOT showed no arrhythmias.  Symptoms correlated with sinus tachycardia.  Symptoms have abated themselves with less stress at work.  -- No further workup or medical therapy indicated  -- Strongly encouraged her to get her potassium checked as this can also exacerbate palpitations.  -- Continue regular blood work and management of her thyroid disorder with primary " "care    3. Elevated blood pressure reading without diagnosis of hypertension  Isolated event, systolic in the 200s in the ER with accompanying chest pain at the time.  Baseline ECG is normal.  Blood pressure today is normal.  Does not check it at home.  Asymptomatic from a cardiac standpoint.  -- No indication for further cardiac testing  --Advised compliance with CPAP and regular exercise.  Keep blood pressure log at home.  Goal blood pressure less than 130/80.    4. History of myocardial infarction  Patient says she was diagnosed with a \"hypovolemic infarction\" around the time of pregnancy when she had significant bleeding years ago (required 11 units of blood).  Never had chest pain.   Says she had this followed up at the [facility] with multiple echoes and was never recommended to take any aspirin or statin. Sounds like this may have been a type II, demand ischemia event.   -- No indication for stress test at this time    5. SORAYA on CPAP  Encouraged strict compliance with nightly CPAP.              Plan   No orders of the defined types were placed in this encounter.    Medications:    Preventative Cardiology:   Tobacco Cessation: N/A  Obstructive Sleep Apnea Screening: Completed  AAA Screening: Deffered  Peripheral Arterial Disease Screening: Deffered       Follow Up:   Return in about 1 year (around 11/7/2025).    Thank you for allowing me to participate in the care of your patient. Please to not hesitate to contact me with additional questions or concerns.     Rufino Costello MD  11/07/2024  13:34 EST  "

## 2024-11-07 ENCOUNTER — OFFICE VISIT (OUTPATIENT)
Dept: CARDIOLOGY | Facility: CLINIC | Age: 44
End: 2024-11-07
Payer: MEDICAID

## 2024-11-07 VITALS
HEIGHT: 70 IN | SYSTOLIC BLOOD PRESSURE: 108 MMHG | DIASTOLIC BLOOD PRESSURE: 70 MMHG | WEIGHT: 293 LBS | OXYGEN SATURATION: 97 % | BODY MASS INDEX: 41.95 KG/M2 | HEART RATE: 65 BPM

## 2024-11-07 DIAGNOSIS — E87.6 HYPOKALEMIA: ICD-10-CM

## 2024-11-07 DIAGNOSIS — R03.0 ELEVATED BLOOD PRESSURE READING WITHOUT DIAGNOSIS OF HYPERTENSION: ICD-10-CM

## 2024-11-07 DIAGNOSIS — I25.2 HISTORY OF MYOCARDIAL INFARCTION: ICD-10-CM

## 2024-11-07 DIAGNOSIS — R00.2 PALPITATIONS: Primary | ICD-10-CM

## 2024-11-07 DIAGNOSIS — G47.33 OSA ON CPAP: ICD-10-CM

## 2024-11-07 PROCEDURE — 99214 OFFICE O/P EST MOD 30 MIN: CPT | Performed by: INTERNAL MEDICINE

## 2024-12-02 ENCOUNTER — TELEPHONE (OUTPATIENT)
Dept: SURGERY | Facility: CLINIC | Age: 44
End: 2024-12-02
Payer: MEDICAID

## 2024-12-02 NOTE — TELEPHONE ENCOUNTER
Please reschedule patient's upcoming appointment with Dr. Mitchell due to ultrasound tech being out of the office that week.

## 2024-12-09 NOTE — PROGRESS NOTES
"Patient: Maria Murray  YOB: 1980    Date: 12/19/2024    Primary Care Provider: Omid Brody PA    Chief Complaint   Patient presents with    Follow-up     AXILLA       History: The patient is in the office today for a 3 month follow up on axilla lymph with ultrasound.  Patient has no complaints today.  Doing well.  No weight loss.    The following portions of the patient's history were reviewed and updated as appropriate: allergies, current medications, past family history, past medical history, past social history, past surgical history and problem list.    Review of Systems   Constitutional:  Negative for chills, fever and unexpected weight change.   HENT:  Negative for hearing loss, trouble swallowing and voice change.    Eyes:  Negative for visual disturbance.   Respiratory:  Negative for apnea, cough, chest tightness, shortness of breath and wheezing.    Cardiovascular:  Negative for chest pain, palpitations and leg swelling.   Gastrointestinal:  Negative for abdominal distention, abdominal pain, anal bleeding, blood in stool, constipation, diarrhea, nausea, rectal pain and vomiting.   Endocrine: Negative for cold intolerance and heat intolerance.   Genitourinary:  Negative for difficulty urinating, dysuria and flank pain.   Musculoskeletal:  Negative for back pain and gait problem.   Skin:  Negative for color change, rash and wound.   Neurological:  Negative for dizziness, syncope, speech difficulty, weakness, light-headedness, numbness and headaches.   Hematological:  Negative for adenopathy. Does not bruise/bleed easily.   Psychiatric/Behavioral:  Negative for confusion. The patient is not nervous/anxious.    All other systems reviewed and are negative.      Vital Signs  Vitals:    12/19/24 1356   Height: 177.8 cm (70\")       Allergies:  Allergies   Allergen Reactions    Other GI Intolerance     REPORTS ALLERGY TO GLUTEN      Penicillins Hives     Beta lactam allergy " details  Antibiotic reaction: hives  Age at reaction: adult   Dose to reaction time: unknown  Reason for antibiotic: unknown  Epinephrine required for reaction?: unknown  Tolerated antibiotics: unknown        Zithromax [Azithromycin] Anxiety       Medications:    Current Outpatient Medications:     [place] Thyroid 60 MG tablet, Take 1 tablet by mouth Daily., Disp: , Rfl:     albuterol sulfate  (90 Base) MCG/ACT inhaler, INHALE 2 PUFFS EVERY SIX HOURS AS NEEDED (Patient not taking: Reported on 11/7/2024), Disp: , Rfl:     fluticasone (FLONASE) 50 MCG/ACT nasal spray, , Disp: , Rfl:     lamoTRIgine (LaMICtal) 150 MG tablet, TAKE ONE-HALF TABLET (75MG) BY MOUTH TWICE DAILY, Disp: , Rfl: 5    levocetirizine (XYZAL) 5 MG tablet, Take 1 tablet by mouth Every Evening., Disp: , Rfl:     montelukast (SINGULAIR) 10 MG tablet, , Disp: , Rfl:     THYROIDTHROID 48.75 MG tablet tablet, Take 1 tablet by mouth Daily., Disp: , Rfl: 2    venlafaxine (EFFEXOR) 37.5 MG tablet, Take 1 tablet by mouth 2 (Two) Times a Day., Disp: , Rfl:     Physical Exam:    Lymphatic exam: No slight adenopathy or masses.  No cervical adenopathy.  I am unable to appreciate the enlarged lymph node in the left axilla.     Results Review:   I reviewed the patient's new clinical results. Ultrasound was reviewed    Review of Systems was reviewed and confirmed as accurate as documented by the MA.    ASSESSMENT/PLAN:    1. Enlarged lymph nodes in armpit         Slightly larger lymph node on exam by ultrasound today than on last exam but still smaller than originally.  This was an incidental finding on previous breast ultrasound.  Still benign findings by ultrasound criteria.  Question cat scratch since she is around many cats.  She wishes to have a biopsy.  I offered her a fine-needle aspiration.  She understands procedure as well as the risk of bleeding and infection and wishes to proceed.    1% lidocaine was used locally after the area was prepped in  usual fashion.  By ultrasound guidance multiple passes using a 23-gauge needle was performed without incident.  Patient tolerated procedure well.    Electronically signed by Becky Neri MA  12/19/24

## 2024-12-19 ENCOUNTER — OFFICE VISIT (OUTPATIENT)
Dept: SURGERY | Facility: CLINIC | Age: 44
End: 2024-12-19
Payer: MEDICAID

## 2024-12-19 VITALS
HEART RATE: 78 BPM | BODY MASS INDEX: 41.95 KG/M2 | RESPIRATION RATE: 12 BRPM | HEIGHT: 70 IN | TEMPERATURE: 98 F | DIASTOLIC BLOOD PRESSURE: 78 MMHG | WEIGHT: 293 LBS | OXYGEN SATURATION: 98 % | SYSTOLIC BLOOD PRESSURE: 122 MMHG

## 2024-12-19 DIAGNOSIS — R59.0 ENLARGED LYMPH NODES IN ARMPIT: Primary | ICD-10-CM

## 2024-12-19 RX ORDER — THYROID,PORK 60 MG
1 TABLET ORAL DAILY
COMMUNITY
Start: 2024-11-25

## 2024-12-20 LAB — REF LAB TEST METHOD: NORMAL

## 2025-01-02 ENCOUNTER — OFFICE VISIT (OUTPATIENT)
Dept: SURGERY | Facility: CLINIC | Age: 45
End: 2025-01-02
Payer: COMMERCIAL

## 2025-01-02 VITALS
HEIGHT: 70 IN | BODY MASS INDEX: 41.95 KG/M2 | TEMPERATURE: 98 F | RESPIRATION RATE: 12 BRPM | DIASTOLIC BLOOD PRESSURE: 76 MMHG | WEIGHT: 293 LBS | OXYGEN SATURATION: 98 % | HEART RATE: 72 BPM | SYSTOLIC BLOOD PRESSURE: 124 MMHG

## 2025-01-02 DIAGNOSIS — R59.0 ENLARGED LYMPH NODES IN ARMPIT: Primary | ICD-10-CM

## 2025-01-02 NOTE — PROGRESS NOTES
"Patient: Maria Murray  YOB: 1980    Date: 01/02/2025    Primary Care Provider: Omid Brody PA    Chief Complaint   Patient presents with    Follow-up     Repeat FNA       History: The patient is in the office today in follow up from FNA axilla.  Pathology was reviewed and indicated non-diagnostic.  She is here today for a repeat FNA..    The following portions of the patient's history were reviewed and updated as appropriate: allergies, current medications, past family history, past medical history, past social history, past surgical history and problem list.    Review of Systems   Constitutional:  Negative for chills, fever and unexpected weight change.   HENT:  Negative for hearing loss, trouble swallowing and voice change.    Eyes:  Negative for visual disturbance.   Respiratory:  Negative for apnea, cough, chest tightness, shortness of breath and wheezing.    Cardiovascular:  Negative for chest pain, palpitations and leg swelling.   Gastrointestinal:  Negative for abdominal distention, abdominal pain, anal bleeding, blood in stool, constipation, diarrhea, nausea, rectal pain and vomiting.   Endocrine: Negative for cold intolerance and heat intolerance.   Genitourinary:  Negative for difficulty urinating, dysuria and flank pain.   Musculoskeletal:  Negative for back pain and gait problem.   Skin:  Negative for color change, rash and wound.   Neurological:  Negative for dizziness, syncope, speech difficulty, weakness, light-headedness, numbness and headaches.   Hematological:  Negative for adenopathy. Does not bruise/bleed easily.   Psychiatric/Behavioral:  Negative for confusion. The patient is not nervous/anxious.    All other systems reviewed and are negative.      Vital Signs  Vitals:    01/02/25 1516   BP: 124/76   Pulse: 72   Resp: 12   Temp: 98 °F (36.7 °C)   TempSrc: Temporal   SpO2: 98%   Weight: (!) 137 kg (303 lb)   Height: 177.8 cm (70\")       Allergies:  Allergies "   Allergen Reactions    Other GI Intolerance     REPORTS ALLERGY TO GLUTEN      Penicillins Hives     Beta lactam allergy details  Antibiotic reaction: hives  Age at reaction: adult   Dose to reaction time: unknown  Reason for antibiotic: unknown  Epinephrine required for reaction?: unknown  Tolerated antibiotics: unknown        Zithromax [Azithromycin] Anxiety       Medications:    Current Outpatient Medications:     [facility] Thyroid 60 MG tablet, Take 1 tablet by mouth Daily., Disp: , Rfl:     fluticasone (FLONASE) 50 MCG/ACT nasal spray, , Disp: , Rfl:     lamoTRIgine (LaMICtal) 150 MG tablet, TAKE ONE-HALF TABLET (75MG) BY MOUTH TWICE DAILY, Disp: , Rfl: 5    levocetirizine (XYZAL) 5 MG tablet, Take 1 tablet by mouth Every Evening., Disp: , Rfl:     montelukast (SINGULAIR) 10 MG tablet, , Disp: , Rfl:     venlafaxine (EFFEXOR) 37.5 MG tablet, Take 1 tablet by mouth 2 (Two) Times a Day., Disp: , Rfl:     Physical Exam:         Results Review:   None     Review of Systems was reviewed and confirmed as accurate as documented by the MA.    ASSESSMENT/PLAN:    1. Enlarged lymph nodes in armpit       I recommend repeating the FNA since the initial specimen was nondiagnostic.  Under ultrasound guidance at the area was prepped 1% lidocaine was used and a 21-gauge needle was used to perform the fine-needle aspiration.  Multiple passes were performed.  Patient tolerated procedure well.  Will follow-up with results and make recommendations based on those.    Electronically signed by Sravan Mitchell MD  01/02/25

## 2025-01-07 LAB — REF LAB TEST METHOD: NORMAL

## 2025-07-18 ENCOUNTER — OFFICE VISIT (OUTPATIENT)
Dept: OBSTETRICS AND GYNECOLOGY | Facility: CLINIC | Age: 45
End: 2025-07-18
Payer: COMMERCIAL

## 2025-07-18 VITALS
WEIGHT: 293 LBS | BODY MASS INDEX: 41.95 KG/M2 | DIASTOLIC BLOOD PRESSURE: 70 MMHG | SYSTOLIC BLOOD PRESSURE: 130 MMHG | HEIGHT: 70 IN

## 2025-07-18 DIAGNOSIS — N89.8 VAGINAL DISCHARGE: ICD-10-CM

## 2025-07-18 DIAGNOSIS — N92.1 MENORRHAGIA WITH IRREGULAR CYCLE: Primary | ICD-10-CM

## 2025-07-18 DIAGNOSIS — Z12.31 ENCOUNTER FOR SCREENING MAMMOGRAM FOR BREAST CANCER: ICD-10-CM

## 2025-07-18 DIAGNOSIS — N94.89 VULVAR BURNING: ICD-10-CM

## 2025-07-18 DIAGNOSIS — N95.1 MENOPAUSAL SYMPTOMS: ICD-10-CM

## 2025-07-18 DIAGNOSIS — N95.2 POSTMENOPAUSAL ATROPHIC VAGINITIS: ICD-10-CM

## 2025-07-18 RX ORDER — FLUCONAZOLE 150 MG/1
TABLET ORAL
Qty: 3 TABLET | Refills: 0 | Status: SHIPPED | OUTPATIENT
Start: 2025-07-18

## 2025-07-18 RX ORDER — METHOCARBAMOL 500 MG/1
TABLET, FILM COATED ORAL
COMMUNITY
Start: 2025-06-13

## 2025-07-18 RX ORDER — CLOTRIMAZOLE AND BETAMETHASONE DIPROPIONATE 10; .64 MG/G; MG/G
1 CREAM TOPICAL 2 TIMES DAILY
Qty: 60 G | Refills: 3 | Status: SHIPPED | OUTPATIENT
Start: 2025-07-18

## 2025-07-18 NOTE — PROGRESS NOTES
"Chief Complaint  Vaginal Irritation (Patient complains of external vaginal irritation since January. )     History of Present Illness:  Patient is 45 y.o.  who presents to [facility] here for evaluation of vaginal and vulvar irritation.  Patient reports having burning of the vulva since January.  She reports it is predominantly external in nature.  She has been treated for yeast infection.  She has not been aware of any recent discharge.  She does report having menopausal symptoms.  She did have hormone levels obtained from her primary care provider earlier this year.  The patient has had a previous uterine ablation in .  She reports over the last year having irregular bleeding.  She reports at times it is heavy in nature.  She does have cramping associated with her bleeding as well.    History  Past Medical History:   Diagnosis Date    Abnormal Pap smear of cervix 2002    Alcoholism     Allergic 1989    Ankle sprain     LEFT AND RIGHT    Anxiety     Arthritis     Bipolar disorder     Breast mass 2023    Bursitis of hip     RIGHT    Celiac disease     Chronic pain disorder     Clotting disorder 2008    Depression 1998    Dislocation, hip     RIGHT    Fracture     LEFT FOOT IN THE PAST    Fracture, patella     LEFT    GERD (gastroesophageal reflux disease)     H/O exercise stress test     STATES \"IT WAS OK\"    H/O seasonal allergies     Heart attack     STATES HAD MI AFTER CHILDBIRTH-STATES DUE TO HYPOVOLEMIA    Heart murmur 2000    History of transfusion     REPORTS DID NOT HAVE A REACTION TO TRANSFUSION    HPV (human papilloma virus) infection     Hypothyroidism 2014    Migraine     Obesity 1979    Obstructive sleep apnea     Palpitations     PCOS (polycystic ovarian syndrome)     Polycystic ovary syndrome     Rh incompatibility     Sore throat     Strep throat     Subluxation of patella     Substance abuse 1994    sober " 06/15/1998    Tear of meniscus of knee     LEFT    Thyroid disease     Urinary symptom or sign     Varicella     Wears glasses      Current Outpatient Medications on File Prior to Visit   Medication Sig Dispense Refill    methocarbamol (ROBAXIN) 500 MG tablet TAKE ONE TABLET BY MOUTH FOUR TIMES DAILY AS NEEDED FOR muscle SPASMS FOR 10 DAYS      [facility] Thyroid 60 MG tablet Take 1 tablet by mouth Daily.      fluticasone (FLONASE) 50 MCG/ACT nasal spray       lamoTRIgine (LaMICtal) 150 MG tablet TAKE ONE-HALF TABLET (75MG) BY MOUTH TWICE DAILY  5    levocetirizine (XYZAL) 5 MG tablet Take 1 tablet by mouth Every Evening.      montelukast (SINGULAIR) 10 MG tablet       venlafaxine (EFFEXOR) 37.5 MG tablet Take 1 tablet by mouth 2 (Two) Times a Day.       No current facility-administered medications on file prior to visit.     Allergies   Allergen Reactions    Other GI Intolerance     REPORTS ALLERGY TO GLUTEN      Penicillins Hives     Beta lactam allergy details  Antibiotic reaction: hives  Age at reaction: adult   Dose to reaction time: unknown  Reason for antibiotic: unknown  Epinephrine required for reaction?: unknown  Tolerated antibiotics: unknown        Zithromax [Azithromycin] Anxiety     Past Surgical History:   Procedure Laterality Date    CERVICAL BIOPSY  W/ LOOP ELECTRODE EXCISION       SECTION      D & C HYSTEROSCOPY ENDOMETRIAL ABLATION N/A 2017    Procedure: DILATATION AND CURETTAGE HYSTEROSCOPY NOVASURE ENDOMETRIAL ABLATION;  Surgeon: Donna Fuentes MD;  Location: [facility];  Service:     DILATATION AND CURETTAGE      PATIENT REPORTS X2    ENDOMETRIAL ABLATION      ENDOSCOPY      KNEE ARTHROSCOPY Right 2017    Procedure: RIGHT KNEE DIAGNOSTIC ARTHROSCOPY WITH PARTIAL LATERAL MENISECTOMY, CHONDRAL SHAVING PATELLA;  Surgeon: Dejan Rodas MD;  Location: [facility] OR;  Service:     KNEE SURGERY Left      Left ATS    WISDOM TOOTH EXTRACTION       Family History   Problem Relation Age of  "Onset    Cancer Mother         Breast    Breast cancer Mother     Uterine cancer Mother     Osteoporosis Mother     Arthritis Mother     Hearing loss Mother     Bone cancer Father     Cancer Father         Bone. brain/lung    Early death Father     Alcohol abuse Brother     Miscarriages / Stillbirths Maternal Grandmother     Kidney disease Paternal Grandmother     Breast cancer Maternal Aunt     Cancer Maternal Aunt         breast    Ovarian cancer Paternal Aunt     Ovarian cancer Paternal Aunt     Cancer Paternal Aunt         Ovarian    Diabetes Maternal Grandfather         type 1    Diabetes Maternal Uncle     Alcohol abuse Maternal Uncle     Diabetes Maternal Uncle         Type 2    Cancer Maternal Aunt         Breast    Cancer Paternal Uncle         Lung    Diabetes Maternal Uncle         type 2    Thyroid disease Maternal Aunt         hyper    Hearing loss Brother     Ovarian cancer Paternal Aunt     Diabetes Maternal Uncle         type 2     Social History     Socioeconomic History    Marital status:    Tobacco Use    Smoking status: Former     Current packs/day: 0.00     Average packs/day: 0.5 packs/day for 16.7 years (8.3 ttl pk-yrs)     Types: Cigarettes     Start date: 1995     Quit date: 2011     Years since quittin.8     Passive exposure: Past    Smokeless tobacco: Never    Tobacco comments:     QUIT in , REPORTS WAS ONLY SMOKING A PACK PER WEEK   Vaping Use    Vaping status: Never Used   Substance and Sexual Activity    Alcohol use: Not Currently    Drug use: Yes     Types: Marijuana    Sexual activity: Yes     Partners: Male     Birth control/protection: Surgical     Comment: vasectomy       Physical Examination:  Vital Signs: /70   Ht 177.8 cm (70\")   Wt 133 kg (293 lb)   BMI 42.04 kg/m²     General Appearance: alert, appears stated age, and cooperative  Breasts: Not performed  Abdomen: no masses, no hepatomegaly, no splenomegaly, soft non-tender, no guarding, and no " rebound tenderness  Pelvic: Clinical staff was present for exam; pelvic examination was required for evaluation  External genitalia:  +erythema of the vulva  :  urethral meatus normal;  Vaginal:  atrophic changes noted.  Thick white discharge noted in clumps.  Cervix:  normal appearance.  Uterus:  normal size, shape and consistency.  Adnexa:  normal bimanual exam of the adnexa.    Data Review:  The following data was reviewed by: Donna Fuentes MD on 07/18/2025:     Labs:    Imaging:  Mammo Diagnostic Digital Tomosynthesis Bilateral With CAD (06/17/2024 13:01)  US breast left limited (07/23/2024 13:06)  X-ray chest PA and lateral (07/25/2024 15:05)  CT HEAD WO CONTRAST (07/26/2024 12:50)  XR Chest 1 View (07/27/2024 20:01)  US Axilla Left (09/10/2024 10:58)  US Axilla Left (12/19/2024 16:10)  US Fine Needle Aspiration BX 1st Lesion (12/19/2024 16:14)  US Fine Needle Aspiration BX 1st Lesion (01/02/2025 16:35)  Medical Records:  LABORATORY - SCAN - LAB-[facility]-4/14/25 (04/14/2025)  PROGRESS NOTES - SCAN - PROG NOTE-[facility]-4/21/25 (04/21/2025)  Assessment and Plan   1. Encounter for screening mammogram for breast cancer  It is recommended per ACOG, for women at average risk to start annual mammogram screening at the age of 40 until the age of 75 and an individualized decision be made for women after age 75.  She was encouraged to continue getting yearly mammograms.  She should report any palpable breast lump(s) or skin changes regardless of mammographic findings.  I explained to Maria that notification regarding her mammogram results will come from the center performing the study.  Our office will not be routinely calling with mammogram results.  It is her responsibility to make sure that the results from the mammogram are communicated to her by the breast center.  If she has any questions about the results, she is welcome to call our office anytime.  The patient reports she will schedule her  mammogram.    Maria was counseled regarding having clinical breast exams and breast self-awareness.  Women aged 29-39 years of age should have clinical breast exams every 1-3 years and yearly aged 40 and older.  The patient was counseled regarding breast self-awareness focusing on having a sense of what is normal for her breasts so that she can tell if there are changes.  Even small changes should be reported to provider.   - Mammo Screening Digital Tomosynthesis Bilateral With CAD; Future      2. Menorrhagia with irregular cycle  The patient was informed that menstrual flow outside of normal volume, duration, regularity, or frequency is considered abnormal uterine bleeding.  The patient was informed that the normal duration of menstrual flow is usually 5 days and the normal cycle typically lasts between 21-35 days.  The patient was informed that heavy menstrual bleeding has been defined as blood loss greater than 80 mL and given that this is hard to quantify excessive blood loss is based on the patient's perception. The patient was informed that AUB most frequently occurs in women aged 19-39 years as a result of pregnancy, structural lesions such as polyps or fibroids, anovulatory cycles, use of hormonal contraception, and endometrial hyperplasia.  She was counseled that endometrial cancer is less common but may occur.  It is recommended that she have a pregnancy test, CBC, and TSH.  Her pap smear needs to be up to date.  She needs screening for Chlamydia if she feels she is at high risk.  It is also recommended that she have a transvaginal ultrasound for further evaluation.  If anatomic abnormalities are noted then then surgery may be indicated. An endometrial ablation may also be an option to control bleeding in women who have completed childbearing.  The various options were discussed regarding medical management of her bleeding to include the use of nonsteroidal antiinflammatory drugs, progestins,  combination oral contraceptives, a levonorgestrel intrauterine device, or tranexamic acid. The patient is informed if there is no improvement in her symptoms with medical management then she will need additional evaluation to include additional laboratory assessment and endometrial sampling.   Pt follow up with [facility] as discussed.  - US Non-ob Transvaginal; Future    3. Menopausal symptoms  The various options for the management of menopausal symptoms was discussed.  The medical treatment options discussed include HRT, SSRIs, SSNRIs, clonidine, and gabapentin.  The risks and benefits were discussed including the findings from the WHI study.  The increased risk of breast cancer, CAD, stroke, and VTE events were discussed for combination therapy vs the increased risk of CV events and breast cancer not being seen in the estrogen only group.  The lowest effective dose for the shortest duration of treatment was discussed in regards to HRT.  Other alternatives including otc supplements and lifestyle changes were also discussed.  Local estrogen therapy to relieve atrophic vaginal symptoms was discussed was well as other alternatives. FSH today as noted.    4. Postmenopausal atrophic vaginitis  Discussed various options for relief of atrophic vaginal symptoms related to menopause. Discussed local therapy for treatment of vaginal symptoms only.  Discussed the different formulation options including cream, ring, and tablets.  Discussed the low risk of systemic absorption in postmenopausal women with atrophy using 25 mcgs of estradiol on a daily basis.  Recommend low dose use 2-3x/wk for maintenance of treatment.  Other treatment options were discussed including the use of water-based and silicone-based vaginal lubricants and moisturizers.  Also discussed was the FDA approved treatment option of ospemifene for moderate to severe dyspareunia.     5. Vulvar burning  Vulvar burning as noted.  Prescription is given for Lotrisone.   Instructions and precautions have been given.    6. Vaginal discharge  Patient with thick white discharge.  Prescription given for Diflucan.  Cultures obtained as well.  Plan pending response to treatment.    Follow Up/Instructions:  Follow up as noted.  Patient was given instructions and counseling regarding her condition or for health maintenance advice. Please see specific information pulled into the AVS if appropriate.     Note: Speech recognition transcription software may have been used to dictate portions of this document.  An attempt at proofreading has been made though minor errors in transcription may still be present.    This note was electronically signed.  Donna Fuentes M.D.

## 2025-07-21 LAB
A VAGINAE DNA VAG QL NAA+PROBE: ABNORMAL SCORE
BVAB2 DNA VAG QL NAA+PROBE: ABNORMAL SCORE
C ALBICANS DNA VAG QL NAA+PROBE: POSITIVE
C GLABRATA DNA VAG QL NAA+PROBE: NEGATIVE
C TRACH DNA SPEC QL NAA+PROBE: NEGATIVE
FSH SERPL-ACNC: 5.7 MIU/ML
MEGA1 DNA VAG QL NAA+PROBE: ABNORMAL SCORE
N GONORRHOEA DNA VAG QL NAA+PROBE: NEGATIVE
T VAGINALIS DNA VAG QL NAA+PROBE: NEGATIVE

## 2025-08-26 ENCOUNTER — OFFICE VISIT (OUTPATIENT)
Dept: OBSTETRICS AND GYNECOLOGY | Facility: CLINIC | Age: 45
End: 2025-08-26
Payer: COMMERCIAL

## 2025-08-26 VITALS
DIASTOLIC BLOOD PRESSURE: 72 MMHG | HEIGHT: 70 IN | WEIGHT: 288.4 LBS | BODY MASS INDEX: 41.29 KG/M2 | SYSTOLIC BLOOD PRESSURE: 118 MMHG

## 2025-08-26 DIAGNOSIS — N92.1 MENORRHAGIA WITH IRREGULAR CYCLE: Primary | ICD-10-CM

## 2025-08-26 DIAGNOSIS — N95.1 MENOPAUSAL SYMPTOMS: ICD-10-CM

## 2025-08-26 DIAGNOSIS — N95.2 POSTMENOPAUSAL ATROPHIC VAGINITIS: ICD-10-CM

## 2025-08-26 PROCEDURE — 1160F RVW MEDS BY RX/DR IN RCRD: CPT | Performed by: OBSTETRICS & GYNECOLOGY

## 2025-08-26 PROCEDURE — 99214 OFFICE O/P EST MOD 30 MIN: CPT | Performed by: OBSTETRICS & GYNECOLOGY

## 2025-08-26 PROCEDURE — 1159F MED LIST DOCD IN RCRD: CPT | Performed by: OBSTETRICS & GYNECOLOGY

## 2025-08-26 RX ORDER — IPRATROPIUM BROMIDE 21 UG/1
SPRAY, METERED NASAL
COMMUNITY
Start: 2025-08-11

## 2025-08-26 RX ORDER — TRIAMCINOLONE ACETONIDE 55 UG/1
2 SPRAY, METERED NASAL DAILY
COMMUNITY
Start: 2025-08-11

## 2025-08-26 RX ORDER — DESLORATADINE 5 MG/1
1 TABLET ORAL DAILY
COMMUNITY
Start: 2025-08-14

## (undated) DEVICE — PAD,ABDOMINAL,5"X9",STERILE,LF,1/PK: Brand: MEDLINE INDUSTRIES, INC.

## (undated) DEVICE — GLV SURG SENSICARE SLT PF LF 8 STRL

## (undated) DEVICE — GLV SURG TRIUMPH PF LTX 7.5 STRL

## (undated) DEVICE — SUT ETHLN 3-0 FS118IN 663H

## (undated) DEVICE — CUFF SCD HEMOFORCE SEQ CALF STD MD

## (undated) DEVICE — RICH MINOR LITHOTOMY: Brand: MEDLINE INDUSTRIES, INC.

## (undated) DEVICE — SOL BETADINE 4OZ

## (undated) DEVICE — COUNT NDL FOAM STRIP W/MAG 60CT

## (undated) DEVICE — SPNG GZ WOVN 4X4IN 12PLY 10/BX STRL

## (undated) DEVICE — PAD GRND REM POLYHESIVE A/ DISP

## (undated) DEVICE — SINGLE PORT MANIFOLD: Brand: NEPTUNE 2

## (undated) DEVICE — GLV SURG BIOGEL M LTX PF 6 1/2

## (undated) DEVICE — DYONICS 25 PATIENT TUBE SET MUST                                    BE USED WITH 7211007, 12 PER BOX

## (undated) DEVICE — SOL IRR NACL 0.9PCT 3000ML

## (undated) DEVICE — ST IRR CYSTO W/SPK 77IN LF

## (undated) DEVICE — POSTN SURG LEG WELL LOW EXTREM 1P/U

## (undated) DEVICE — DRSNG WND GZ CURAD OIL EMULSION 3X3IN STRL

## (undated) DEVICE — TUBING, SUCTION, 1/4" X 12', STRAIGHT: Brand: MEDLINE

## (undated) DEVICE — BNDG ELAS ELITE V/CLOSE 6IN 5YD LF STRL

## (undated) DEVICE — GOWN,SIRUS,NON REINFRCD,LARGE,SET IN SL: Brand: MEDLINE

## (undated) DEVICE — SYR CONTRL LUERLOK 10CC

## (undated) DEVICE — PK KN ARTHSCP 20

## (undated) DEVICE — EMERALD ARTHROSCOPY SHEET: Brand: CONVERTORS

## (undated) DEVICE — PROB ABL ENDOMTRL NOVASURE/G3 IMPEDENCE 1P/U

## (undated) DEVICE — Device

## (undated) DEVICE — TBG INFLOW FMS DUO W/O 1WY VLV

## (undated) DEVICE — GOWN,PREVENTION PLUS,XLARGE,STERILE: Brand: MEDLINE

## (undated) DEVICE — 3.5 MM FULL RADIUS STRAIGHT                                    BLADES, POWER/EP-1, BEIGE, PACKAGED                                    6 PER BOX, STERILE

## (undated) DEVICE — SUT GUT CHRM 2/0 SH 27IN G123H